# Patient Record
Sex: FEMALE | Race: WHITE | Employment: STUDENT | URBAN - METROPOLITAN AREA
[De-identification: names, ages, dates, MRNs, and addresses within clinical notes are randomized per-mention and may not be internally consistent; named-entity substitution may affect disease eponyms.]

---
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
## 2022-09-27 NOTE — TELEPHONE ENCOUNTER
NO PAP SMEAR. Please review; protocol failed/no protocol.      Requested Prescriptions   Pending Prescriptions Disp Refills    NORGESTIM-ETH ESTRAD TRIPHASIC 0.18/0.215/0.25 MG-35 MCG Oral Tab [Pharmacy Med Name: Zoraida Webster 0.18-0.215-0.25/0.035] 84 tablet 3     Sig: TAKE 1 TABLET BY MOUTH EVERY DAY        Gynecology Medication Protocol Failed - 9/26/2022  9:25 PM        Failed - Pass dependent on manual look-up of last PAP and patient compliance with PAP follow up recommendations        Passed - In person appointment or virtual visit in the past 12 mos or appointment in next 3 mos       Recent Outpatient Visits              11 months ago Annual physical exam    Obinna Yung MD    Office Visit    1 year ago Encounter for well woman exam with routine gynecological exam    [place] [facility], [address] [place] [address], Isreal Cruz APRN    Office Visit    3 years ago Well adolescent visit    [facility] [place] [facility]    Office Visit    4 years ago Missed menses    [place] [facility], [address] [place], [place] [address]Tanner Mol, APRN    Office Visit    6 years ago [facility] adolescent visit    [facility] [place] [facility]    Office Visit                        Recent Outpatient Visits              11 months ago Annual physical exam    Obinna Yung MD    Office Visit    1 year ago Encounter for well woman exam with routine gynecological exam    [place] [facility], [address] [place], [place] [address]Anthony Butler, APRN    Office Visit    3 years ago Well adolescent visit    [address] [place] for Vi Villalpando MD    Office Visit    4 years ago Missed menses    [place] [facility], [address] [place] [address], [address], APRN    Office Visit    6 years ago Well adolescent visit    [address] [facility] MD    Office Visit

## 2022-10-03 RX ORDER — NORGESTIMATE AND ETHINYL ESTRADIOL 7DAYSX3 28
1 KIT ORAL DAILY
Qty: 90 EACH | Refills: 3 | OUTPATIENT
Start: 2022-10-03

## 2022-10-03 RX ORDER — NORGESTIMATE AND ETHINYL ESTRADIOL 7DAYSX3 28
1 KIT ORAL DAILY
Qty: 90 EACH | Refills: 3 | Status: SHIPPED | OUTPATIENT
Start: 2022-10-03

## 2022-10-03 RX ORDER — NORGESTIMATE AND ETHINYL ESTRADIOL 7DAYSX3 28
1 KIT ORAL DAILY
Qty: 84 TABLET | Refills: 1 | Status: SHIPPED | OUTPATIENT
Start: 2022-10-03

## 2024-02-06 RX ORDER — NORGESTIMATE AND ETHINYL ESTRADIOL 7DAYSX3 28
1 KIT ORAL DAILY
Qty: 84 TABLET | Refills: 0 | Status: SHIPPED | OUTPATIENT
Start: 2024-02-06

## 2024-02-06 NOTE — TELEPHONE ENCOUNTER
[employer] message with recommendation sent to pt, due for appt.   To reception staff, pls call pt for appt send back to Triage pool once appt made.  Thanks         No future appointments.        Protocol Failed/ No Protocol    Requested Prescriptions   Pending Prescriptions Disp Refills    NORGESTIM-ETH ESTRAD TRIPHASIC 0.18/0.215/0.25 MG-35 MCG Oral Tab [Pharmacy Med Name: [facility] 0.18-0.215-0.25/0.035] 84 tablet 3     Sig: Take 1 tablet by mouth daily. PT NEEDS TO SCHEDULE ANNUAL FOR ADDITIONAL REFILLS.               Recent Outpatient Visits              2 years ago Annual physical exam    [facility] [place] Cynthia Latham MD    Office Visit    3 years ago Encounter for well woman exam with routine gynecological exam    [facility] Olivia Grijalva APRN    Office Visit    4 years ago Well adolescent visit    [facility] [place]    Office Visit    5 years ago Missed menses    [facility] Lizzie Dc APRN    Office Visit    7 years ago Well adolescent visit    [facility] [place]    Office Visit

## 2024-07-13 ENCOUNTER — TELEPHONE (OUTPATIENT)
Dept: OBGYN CLINIC | Facility: CLINIC | Age: 24
End: 2024-07-13

## 2024-07-13 NOTE — TELEPHONE ENCOUNTER
Patient made an appointment via [employer] for 7/18 with Dr Gamez to establish prenatal care. Please call to advise.

## 2024-07-15 ENCOUNTER — OFFICE VISIT (OUTPATIENT)
Dept: OBGYN CLINIC | Facility: CLINIC | Age: 24
End: 2024-07-15
Payer: COMMERCIAL

## 2024-07-15 VITALS
BODY MASS INDEX: 19.66 KG/M2 | WEIGHT: 118 LBS | DIASTOLIC BLOOD PRESSURE: 76 MMHG | SYSTOLIC BLOOD PRESSURE: 121 MMHG | HEART RATE: 82 BPM | HEIGHT: 65 IN

## 2024-07-15 DIAGNOSIS — N92.6 MISSED MENSES: Primary | ICD-10-CM

## 2024-07-15 DIAGNOSIS — Z34.00: ICD-10-CM

## 2024-07-15 PROCEDURE — 3008F BODY MASS INDEX DOCD: CPT | Performed by: ADVANCED PRACTICE MIDWIFE

## 2024-07-15 PROCEDURE — 3078F DIAST BP <80 MM HG: CPT | Performed by: ADVANCED PRACTICE MIDWIFE

## 2024-07-15 PROCEDURE — 99214 OFFICE O/P EST MOD 30 MIN: CPT | Performed by: ADVANCED PRACTICE MIDWIFE

## 2024-07-15 PROCEDURE — 3074F SYST BP LT 130 MM HG: CPT | Performed by: ADVANCED PRACTICE MIDWIFE

## 2024-07-15 RX ORDER — MELATONIN
50 3 TIMES DAILY
Qty: 90 TABLET | Refills: 2 | Status: SHIPPED | OUTPATIENT
Start: 2024-07-15

## 2024-07-15 NOTE — PROGRESS NOTES
Subjective:   Patient ID: Melissa Desai is a 24 year old female.    Melissa presents for missed menses visit. LMP approximately 6/1/24 with regular periods. GA by LMP 5.5wks. +HPT last Monday. Reports nausea and vomiting. Denies bleeding or pelvic pain.    Denies significant medical history        History/Other:   Review of Systems   All other systems reviewed and are negative.    Current Outpatient Medications   Medication Sig Dispense Refill    Doxylamine Succinate, Sleep, 25 MG Oral Tab Take 25 mg by mouth nightly as needed for Sleep. 30 tablet 2    pyridoxine 50 MG Oral Tab Take 1 tablet (50 mg total) by mouth in the morning, at noon, and at bedtime. 90 tablet 2    Norgestim-Eth Estrad Triphasic 0.18/0.215/0.25 MG-35 MCG Oral Tab Take 1 tablet by mouth daily. PT NEEDS TO SCHEDULE ANNUAL FOR ADDITIONAL REFILLS. (Patient not taking: Reported on 7/15/2024) 84 tablet 0    Norgestim-Eth Estrad Triphasic 0.18/0.215/0.25 MG-35 MCG Oral Tab Take 1 tablet by mouth daily. (Patient not taking: Reported on 7/15/2024) 84 tablet 1     Allergies:No Known Allergies    Objective:   Physical Exam  Vitals and nursing note reviewed.   Constitutional:       General: She is not in acute distress.     Appearance: Normal appearance. She is normal weight. She is not ill-appearing, toxic-appearing or diaphoretic.   Cardiovascular:      Pulses: Normal pulses.   Pulmonary:      Effort: Pulmonary effort is normal.   Neurological:      Mental Status: She is alert and oriented to person, place, and time.   Psychiatric:         Mood and Affect: Mood normal.         Behavior: Behavior normal.         Thought Content: Thought content normal.         Judgment: Judgment normal.         Assessment & Plan:   1. Missed menses    2. Normal first pregnancy with uncertain date of LMP, antepartum (HCC)        No orders of the defined types were placed in this encounter.      Meds This Visit:  Requested Prescriptions     Signed Prescriptions Disp  Refills    Doxylamine Succinate, Sleep, 25 MG Oral Tab 30 tablet 2     Sig: Take 25 mg by mouth nightly as needed for Sleep.    pyridoxine 50 MG Oral Tab 90 tablet 2     Sig: Take 1 tablet (50 mg total) by mouth in the morning, at noon, and at bedtime.       Imaging & Referrals:  US PREG 1ST TRIMESTER (CPT=76801)    1.  Discussed importance of folic acid, calcium, vitamin D.   2.  Reviewed pregnancy recommendations regarding weight gain, diet, fish consumption, consumption of deli-meats and hot dogs, making sure food is appropriately cooked and washed to avoid food-borne illnesses.    3.  Travel discussed, avoid travel to Covid zones, use of support stockings with flights longer than 3 hours, movement every 2-3 hours if driving  4.  Discussed exercise, avoid jacuzzi, sauna, hot tubs and steam rooms.   5.  Discussed avoidance of alcohol, smoking, and minimizing caffeine.    6.  Warning signs reviewed advised to call office if occur.    7.  First Trimester chromosomal screening Cell free Fetal DNA and US schedule discussed.  Desires First trimester screen testing  8. Genetic screening and ACOG/ ACMG guidelines for CF, SMA, Fragile X & hemoglobinopathies.  Informrd our offices uses the Preparent Standard Panel but other sources are available.  If interested they should check their insurance and various costs  9. ONTD risks discussed. No risk factors identified.  10. Pre- Eclampsia Risk Assessment:   High risk Factors- none  Moderate Risk Factors: nullip    Ultrasound ordered due to uncertain LMP    Discussed comfort measures for nausea and vomiting. Recommend unisom and B6.     12.  Schedule RN OB ED visit   13.  30 minutes face to face counseling, chart review, orders and coordination of care

## 2024-09-13 ENCOUNTER — TELEPHONE (OUTPATIENT)
Dept: OBGYN CLINIC | Facility: CLINIC | Age: 24
End: 2024-09-13

## 2024-09-13 RX ORDER — MELATONIN
50 3 TIMES DAILY
Qty: 90 TABLET | Refills: 2 | Status: SHIPPED | OUTPATIENT
Start: 2024-09-13

## 2024-09-13 NOTE — TELEPHONE ENCOUNTER
Vitamin B-6 50 Mg tablet     Directions for use: take 1 tablet (50mg) by mouth in the morning, at noon and at bedtime     Qty: 270     PATIENT IS REQUESTING A 90 DAY SUPPLY

## 2024-11-11 RX ORDER — NORGESTIMATE AND ETHINYL ESTRADIOL 7DAYSX3 28
1 KIT ORAL DAILY
Qty: 84 TABLET | Refills: 0 | Status: SHIPPED | OUTPATIENT
Start: 2024-11-11

## 2024-11-11 NOTE — TELEPHONE ENCOUNTER
Protocol Failed/ No Protocol    Requested Prescriptions   Pending Prescriptions Disp Refills    NORGESTIM-ETH ESTRAD TRIPHASIC 0.18/0.215/0.25 MG-35 MCG Oral Tab [Pharmacy Med Name: [facility] 0.18-0.215-0.25/0.035] 84 tablet 0     Sig: Take 1 tablet by mouth daily. PT NEEDS TO SCHEDULE ANNUAL FOR ADDITIONAL REFILLS.       Gynecology Medication Protocol Failed - 11/11/2024  3:21 PM        Failed - PASS-PENDING LAST PAP WNL--VIA MANUAL LOOKUP        Failed - Physical or Pelvic/Breast in past 12 or next 3 mos--VIA MANUAL LOOKUP               Recent Outpatient Visits              3 months ago Missed menses    [facility] Sobia Payton CNM    Office Visit    3 years ago Annual physical exam    [facility] [place]    Office Visit    4 years ago Encounter for well woman exam with routine gynecological exam    [facility] Olivia Grijalva APRN    Office Visit    5 years ago Well adolescent visit    [facility] [place]    Office Visit    6 years ago Missed menses    [facility] Lizzie Dc APN    Office Visit

## 2025-01-10 ENCOUNTER — OFFICE VISIT (OUTPATIENT)
Dept: OBGYN CLINIC | Facility: CLINIC | Age: 25
End: 2025-01-10
Payer: COMMERCIAL

## 2025-01-10 VITALS
WEIGHT: 131 LBS | BODY MASS INDEX: 22.36 KG/M2 | DIASTOLIC BLOOD PRESSURE: 89 MMHG | HEART RATE: 121 BPM | HEIGHT: 64 IN | SYSTOLIC BLOOD PRESSURE: 137 MMHG

## 2025-01-10 DIAGNOSIS — N92.6 MISSED MENSES: Primary | ICD-10-CM

## 2025-01-10 LAB
CONTROL LINE PRESENT WITH A CLEAR BACKGROUND (YES/NO): YES YES/NO
KIT LOT #: NORMAL NUMERIC
PREGNANCY TEST, URINE: POSITIVE

## 2025-01-10 PROCEDURE — 99214 OFFICE O/P EST MOD 30 MIN: CPT | Performed by: ADVANCED PRACTICE MIDWIFE

## 2025-01-10 PROCEDURE — 81025 URINE PREGNANCY TEST: CPT | Performed by: ADVANCED PRACTICE MIDWIFE

## 2025-01-10 PROCEDURE — 3075F SYST BP GE 130 - 139MM HG: CPT | Performed by: ADVANCED PRACTICE MIDWIFE

## 2025-01-10 PROCEDURE — 3079F DIAST BP 80-89 MM HG: CPT | Performed by: ADVANCED PRACTICE MIDWIFE

## 2025-01-10 PROCEDURE — 3008F BODY MASS INDEX DOCD: CPT | Performed by: ADVANCED PRACTICE MIDWIFE

## 2025-01-10 NOTE — PROGRESS NOTES
Melissa Desai is a 24 year old female.    HPI:     Chief Complaint   Patient presents with    Gyn Exam     Missed menses, chavo 2025 lmp 10/21/2024 pt is 11 weeks and four days.            Had EAB over the summer. This is same partner. He is involved, unplanned but happy.  Sure LMP 10/21/24, CHAVO 25, now at 11 4/7 wks  Denies pain or bleeding. + nausea but now resolved, currently no vomiting.  Denies fevers, flu like symptoms or symptoms of URI  Current medications: PNV    OB History    Para Term  AB Living   2 0 0 0 1 0   SAB IAB Ectopic Multiple Live Births   0 0 0 0 0       Menarche: 17  Period Cycle (Days): pregnant  Period Duration (Days): pregnant  Period Flow: pregnant  Use of Birth Control (if yes, specify type): None      HISTORY:  Past Medical History:    Hemangioma of skin    right arm      History reviewed. No pertinent surgical history.   Family History   Problem Relation Age of Onset    Diabetes Paternal Grandfather     Cancer Paternal Grandfather     Hypertension Paternal Grandfather       Social History:   Social History     Socioeconomic History    Marital status: Single   Tobacco Use    Smoking status: Never    Smokeless tobacco: Never   Substance and Sexual Activity    Alcohol use: No    Drug use: No        Medications (Active prior to today's visit):  Current Outpatient Medications   Medication Sig Dispense Refill    Norgestim-Eth Estrad Triphasic 0.18/0.215/0.25 MG-35 MCG Oral Tab Take 1 tablet by mouth daily. PT NEEDS TO SCHEDULE ANNUAL FOR ADDITIONAL REFILLS. (Patient not taking: Reported on 1/10/2025) 84 tablet 0    pyridoxine 50 MG Oral Tab Take 1 tablet (50 mg total) by mouth in the morning, at noon, and at bedtime. (Patient not taking: Reported on 1/10/2025) 90 tablet 2    Doxylamine Succinate, Sleep, 25 MG Oral Tab Take 25 mg by mouth nightly as needed for Sleep. (Patient not taking: Reported on 1/10/2025) 30 tablet 2    Norgestim-Eth Estrad Triphasic  0.18/0.215/0.25 MG-35 MCG Oral Tab Take 1 tablet by mouth daily. (Patient not taking: Reported on 1/10/2025) 84 tablet 1       Allergies:  Allergies[1]      ROS:       History obtained from the patient  General ROS: positive for  - fatigue  negative for - chills, fever, or night sweats  Psychological ROS: negative for - anxiety, depression, mood swings, or suicidal ideation  ENT ROS: negative for - headaches, nasal congestion, or nasal discharge  Allergy and Immunology ROS: negative for - nasal congestion or postnasal drip  Respiratory ROS: no cough, shortness of breath, or wheezing  Cardiovascular ROS: no chest pain or dyspnea on exertion  Gastrointestinal ROS: negative for - nausea/vomiting  negative for - abdominal pain or change in stools  Genito-Urinary ROS: no dysuria, trouble voiding, or hematuria  Neurological ROS: negative for - dizziness or headaches      PHYSICAL EXAM:     Vitals:    01/10/25 0934   BP: 137/89   Pulse: (!) 121       Physical Exam  Constitutional:       General: She is not in acute distress.     Appearance: Normal appearance. She is not ill-appearing.   Pulmonary:      Effort: Pulmonary effort is normal.   Abdominal:      General: Abdomen is flat.      Palpations: Abdomen is soft.      Tenderness: There is no abdominal tenderness.   Neurological:      Mental Status: She is alert and oriented to person, place, and time.   Psychiatric:         Mood and Affect: Mood normal.         Behavior: Behavior normal.         Thought Content: Thought content normal.          Urine HCG +   Initially unable to hear FHT by doppler. Limited bs abdominal ultrasound with + IUP, + FM and cardiac activity.  Then able to doppler FHT @ 150 BMP     ASSESSMENT/PLAN:      There are no diagnoses linked to this encounter.           1.  Continue PNV with DHA.   2.  Reviewed pregnancy recommendations and avoidances of pregnancy and written information provided.   3.  Travel discussed, increased risk of clotting in  pregnancy, so recommend use of support stockings with flights longer than 3 hours, movement every 2-3 hours if driving  4.  Warning signs reviewed advised to call office if occur.    5.  First Trimester chromosomal screening, genetic screening, free Cell Fetal DNA, carrier screening and US schedule discussed. Offered optional u/s for dating/viability.   6. Offered flu vaccine, declined  7. ONTD risks discussed. No risk factors    8. Pre- Eclampsia Risk Assessment:   High risk Factors- none  Moderate Risk Factors: Primip    High risk factors. 1 of below:  -Hx of pre-e  -Autoimmune disease (lupus, antiphospholipid syndrome)  -Multifetal pregnancy  -CHTN  -DM (type 1 or 2)    Moderate risk factors. 2 or More:   -Nullip  -Obesity  -Family hx preeclampsia (mother or sister)  - Socioeconomic characteristics (AA race, low socioeconomic status)  -AMA  -Personal history factors (Hx of low birth weight or SGA, previous adverse pregnancy outcome, > 10 yr preg interval)    9.  Schedule RN OB ED visit         My total time spent caring for the patient on the day of the encounter:  30 minutes, which included: chart review, exam, care coordination, charting, and counseling as per above.          1/10/2025  Melissa Barillas CNM           [1] No Known Allergies

## 2025-01-17 ENCOUNTER — TELEPHONE (OUTPATIENT)
Dept: OBGYN CLINIC | Facility: CLINIC | Age: 25
End: 2025-01-17

## 2025-01-23 ENCOUNTER — NURSE ONLY (OUTPATIENT)
Dept: OBGYN CLINIC | Facility: CLINIC | Age: 25
End: 2025-01-23

## 2025-01-23 DIAGNOSIS — Z34.01 ENCOUNTER FOR SUPERVISION OF NORMAL FIRST PREGNANCY IN FIRST TRIMESTER (HCC): Primary | ICD-10-CM

## 2025-01-23 RX ORDER — CHOLECALCIFEROL (VITAMIN D3) 25 MCG
1 TABLET,CHEWABLE ORAL DAILY
COMMUNITY

## 2025-01-23 NOTE — PROGRESS NOTES
Pt is a  with EDC of 2025 based on 10/21/2024.     Med Hx-Hemangioma of skin    OB Hx-EAB     Telephone OB RN Education visit. Education materials reviewed with pt including, but not limited to: plan of care, safe medications, guidance on nutrition, travel, food safety, when to call the faustino MONTOYA.     Pt agreeable to blood transfusion if needed.     First trimester prenatal labs ordered for pt.     Pt counseled on the availability of genetic screenings including: cell free DNA, FTS w/US,Quad screen, MSAFP, and CF screening. Pt requesting cell free DNA testing. Pt to receive kit and paperwork at next visit.      Media policy for [facility] reviewed with pt.    EPDS score for today-0    Epidural-open    Circumcision-yes    Feeding-breast    Transfusion-yes    How pt heard about the midwives office-on-line

## 2025-02-03 RX ORDER — NORGESTIMATE AND ETHINYL ESTRADIOL 7DAYSX3 28
1 KIT ORAL DAILY
Qty: 84 TABLET | Refills: 0 | OUTPATIENT
Start: 2025-02-03

## 2025-02-06 ENCOUNTER — INITIAL PRENATAL (OUTPATIENT)
Dept: OBGYN CLINIC | Facility: CLINIC | Age: 25
End: 2025-02-06
Payer: COMMERCIAL

## 2025-02-06 VITALS
HEART RATE: 92 BPM | WEIGHT: 139 LBS | BODY MASS INDEX: 24 KG/M2 | SYSTOLIC BLOOD PRESSURE: 121 MMHG | DIASTOLIC BLOOD PRESSURE: 83 MMHG

## 2025-02-06 DIAGNOSIS — Z36.89 ENCOUNTER FOR FETAL ANATOMIC SURVEY (HCC): ICD-10-CM

## 2025-02-06 DIAGNOSIS — Z34.01 ENCOUNTER FOR SUPERVISION OF NORMAL FIRST PREGNANCY IN FIRST TRIMESTER (HCC): Primary | ICD-10-CM

## 2025-02-06 DIAGNOSIS — Z11.3 SCREEN FOR STD (SEXUALLY TRANSMITTED DISEASE): ICD-10-CM

## 2025-02-06 PROBLEM — Z34.90 PREGNANT (HCC): Status: ACTIVE | Noted: 2025-02-06

## 2025-02-06 PROCEDURE — 3079F DIAST BP 80-89 MM HG: CPT | Performed by: ADVANCED PRACTICE MIDWIFE

## 2025-02-06 PROCEDURE — 3074F SYST BP LT 130 MM HG: CPT | Performed by: ADVANCED PRACTICE MIDWIFE

## 2025-02-06 NOTE — PROGRESS NOTES
Here for NOB visit, feeling well overall, no nausea, some fatigue. Denies 2nd trimester danger signs. Here today with stepdaughter       Patient's last menstrual period was 10/21/2024 (exact date). 2025, by Last Menstrual Period 15w3d     NOB labs- needs to complete, will try to today  Genetic screening- Given Elisa Kit  Ultrasound: Level 1 with OBs ordered  Med hx: none  Allergies: NKDA.   Problem list- Updated  Abuse/Feel safe in home- yes  [unfilled] 0 at nurse ed    Pre-e risk: low risk, Primip   Prior births:n/a    Physical: see flowsheet  Pap: never had, declines today. Will complete at another visit or PP    Warning signs reviewed. RTC in 4 weeks    AISHA Hardwick under direct supervision and in collaboration with Delores Kirk CNM

## 2025-03-01 ENCOUNTER — LAB ENCOUNTER (OUTPATIENT)
Dept: LAB | Facility: HOSPITAL | Age: 25
End: 2025-03-01
Attending: ADVANCED PRACTICE MIDWIFE
Payer: COMMERCIAL

## 2025-03-01 DIAGNOSIS — Z11.3 SCREEN FOR STD (SEXUALLY TRANSMITTED DISEASE): ICD-10-CM

## 2025-03-01 DIAGNOSIS — Z34.01 ENCOUNTER FOR SUPERVISION OF NORMAL FIRST PREGNANCY IN FIRST TRIMESTER (HCC): ICD-10-CM

## 2025-03-01 LAB
ANTIBODY SCREEN: NEGATIVE
BASOPHILS # BLD AUTO: 0.02 X10(3) UL (ref 0–0.2)
BASOPHILS NFR BLD AUTO: 0.2 %
DEPRECATED RDW RBC AUTO: 45.9 FL (ref 35.1–46.3)
EOSINOPHIL # BLD AUTO: 0.08 X10(3) UL (ref 0–0.7)
EOSINOPHIL NFR BLD AUTO: 0.7 %
ERYTHROCYTE [DISTWIDTH] IN BLOOD BY AUTOMATED COUNT: 12.9 % (ref 11–15)
EST. AVERAGE GLUCOSE BLD GHB EST-MCNC: 103 MG/DL (ref 68–126)
HBA1C MFR BLD: 5.2 % (ref ?–5.7)
HBV SURFACE AG SER-ACNC: <0.1 [IU]/L
HBV SURFACE AG SERPL QL IA: NONREACTIVE
HCT VFR BLD AUTO: 34 %
HCV AB SERPL QL IA: NONREACTIVE
HGB BLD-MCNC: 11.4 G/DL
IMM GRANULOCYTES # BLD AUTO: 0.04 X10(3) UL (ref 0–1)
IMM GRANULOCYTES NFR BLD: 0.3 %
LYMPHOCYTES # BLD AUTO: 1.86 X10(3) UL (ref 1–4)
LYMPHOCYTES NFR BLD AUTO: 16.1 %
MCH RBC QN AUTO: 32.5 PG (ref 26–34)
MCHC RBC AUTO-ENTMCNC: 33.5 G/DL (ref 31–37)
MCV RBC AUTO: 96.9 FL
MONOCYTES # BLD AUTO: 0.61 X10(3) UL (ref 0.1–1)
MONOCYTES NFR BLD AUTO: 5.3 %
NEUTROPHILS # BLD AUTO: 8.96 X10 (3) UL (ref 1.5–7.7)
NEUTROPHILS # BLD AUTO: 8.96 X10(3) UL (ref 1.5–7.7)
NEUTROPHILS NFR BLD AUTO: 77.4 %
PLATELET # BLD AUTO: 343 10(3)UL (ref 150–450)
RBC # BLD AUTO: 3.51 X10(6)UL
RH BLOOD TYPE: POSITIVE
RUBV IGG SER QL: POSITIVE
RUBV IGG SER-ACNC: 77 IU/ML (ref 10–?)
T PALLIDUM AB SER QL IA: NONREACTIVE
WBC # BLD AUTO: 11.6 X10(3) UL (ref 4–11)

## 2025-03-01 PROCEDURE — 86901 BLOOD TYPING SEROLOGIC RH(D): CPT | Performed by: ADVANCED PRACTICE MIDWIFE

## 2025-03-01 PROCEDURE — 86850 RBC ANTIBODY SCREEN: CPT | Performed by: ADVANCED PRACTICE MIDWIFE

## 2025-03-01 PROCEDURE — 86787 VARICELLA-ZOSTER ANTIBODY: CPT | Performed by: ADVANCED PRACTICE MIDWIFE

## 2025-03-01 PROCEDURE — 87340 HEPATITIS B SURFACE AG IA: CPT | Performed by: ADVANCED PRACTICE MIDWIFE

## 2025-03-01 PROCEDURE — 87086 URINE CULTURE/COLONY COUNT: CPT | Performed by: ADVANCED PRACTICE MIDWIFE

## 2025-03-01 PROCEDURE — 87389 HIV-1 AG W/HIV-1&-2 AB AG IA: CPT | Performed by: ADVANCED PRACTICE MIDWIFE

## 2025-03-01 PROCEDURE — 85025 COMPLETE CBC W/AUTO DIFF WBC: CPT | Performed by: ADVANCED PRACTICE MIDWIFE

## 2025-03-01 PROCEDURE — 86803 HEPATITIS C AB TEST: CPT | Performed by: ADVANCED PRACTICE MIDWIFE

## 2025-03-01 PROCEDURE — 87491 CHLMYD TRACH DNA AMP PROBE: CPT | Performed by: ADVANCED PRACTICE MIDWIFE

## 2025-03-01 PROCEDURE — 86900 BLOOD TYPING SEROLOGIC ABO: CPT | Performed by: ADVANCED PRACTICE MIDWIFE

## 2025-03-01 PROCEDURE — 83021 HEMOGLOBIN CHROMOTOGRAPHY: CPT | Performed by: ADVANCED PRACTICE MIDWIFE

## 2025-03-01 PROCEDURE — 86762 RUBELLA ANTIBODY: CPT | Performed by: ADVANCED PRACTICE MIDWIFE

## 2025-03-01 PROCEDURE — 86780 TREPONEMA PALLIDUM: CPT | Performed by: ADVANCED PRACTICE MIDWIFE

## 2025-03-01 PROCEDURE — 83036 HEMOGLOBIN GLYCOSYLATED A1C: CPT | Performed by: ADVANCED PRACTICE MIDWIFE

## 2025-03-01 PROCEDURE — 87591 N.GONORRHOEAE DNA AMP PROB: CPT | Performed by: ADVANCED PRACTICE MIDWIFE

## 2025-03-01 PROCEDURE — 83020 HEMOGLOBIN ELECTROPHORESIS: CPT | Performed by: ADVANCED PRACTICE MIDWIFE

## 2025-03-03 LAB
C TRACH DNA SPEC QL NAA+PROBE: NEGATIVE
N GONORRHOEA DNA SPEC QL NAA+PROBE: NEGATIVE
VZV IGG SER IA-ACNC: 1.32 (ref 1–?)

## 2025-03-04 ENCOUNTER — MED REC SCAN ONLY (OUTPATIENT)
Dept: OBGYN CLINIC | Facility: CLINIC | Age: 25
End: 2025-03-04

## 2025-03-05 LAB
HGB A2 MFR BLD: 2.8 % (ref 1.5–3.5)
HGB PNL BLD ELPH: 97.2 % (ref 95.5–100)

## 2025-03-06 ENCOUNTER — ROUTINE PRENATAL (OUTPATIENT)
Dept: OBGYN CLINIC | Facility: CLINIC | Age: 25
End: 2025-03-06
Payer: COMMERCIAL

## 2025-03-06 VITALS
SYSTOLIC BLOOD PRESSURE: 142 MMHG | HEART RATE: 98 BPM | BODY MASS INDEX: 26 KG/M2 | WEIGHT: 150 LBS | DIASTOLIC BLOOD PRESSURE: 78 MMHG

## 2025-03-06 DIAGNOSIS — Z34.92 PRENATAL CARE, SECOND TRIMESTER (HCC): Primary | ICD-10-CM

## 2025-03-06 PROCEDURE — 3077F SYST BP >= 140 MM HG: CPT | Performed by: ADVANCED PRACTICE MIDWIFE

## 2025-03-06 PROCEDURE — 3078F DIAST BP <80 MM HG: CPT | Performed by: ADVANCED PRACTICE MIDWIFE

## 2025-03-06 NOTE — PROGRESS NOTES
Feeling well. Not yet feeling fetal movement. Denies contractions, LOF, vaginal bleeding.     Has not schedule ultrasound. Provided phone number for her to schedule.    Blood pressure was mildly elevated. She had coffee before coming. Discussed monitoring blood pressure at home.     Plan:  BP check next week  PASTOR 4 weeks    Reviewed second trimester warning signs and when to call.

## 2025-03-10 ENCOUNTER — TELEPHONE (OUTPATIENT)
Dept: OBGYN CLINIC | Facility: CLINIC | Age: 25
End: 2025-03-10

## 2025-03-10 NOTE — TELEPHONE ENCOUNTER
Pt verified name and .    Pt has been unable to schedule ultrasound. Informed pt that ultrasound was ordered with [facility]- office. Phone number and directions provided for scheduling. Advised that pt call the office if they are still experiencing difficulty scheduling. Pt verbalized understanding and agreed.

## 2025-03-12 ENCOUNTER — TELEPHONE (OUTPATIENT)
Dept: OBGYN CLINIC | Facility: CLINIC | Age: 25
End: 2025-03-12

## 2025-03-12 DIAGNOSIS — Z34.92 PRENATAL CARE, SECOND TRIMESTER (HCC): Primary | ICD-10-CM

## 2025-03-12 NOTE — TELEPHONE ENCOUNTER
Pt verified name and .    Pt requesting anatomy scan order with another department as [place] [id number] office is not able to schedule pt until April. Pt states that [facility]- office ultrasound technician is out of the office until April, and pt would like to be seen in another office to be seen sooner. Advised that message will be sent to CNM on call for approval of ultrasound order with another office/department. Pt verbalized understanding and agreed. Pt requesting update via [employer] message.

## 2025-03-12 NOTE — TELEPHONE ENCOUNTER
Patient is calling  said she can't get ultrasound till 25 weeks asking a nurse to call her to  change order to get done sooner

## 2025-03-13 ENCOUNTER — NURSE ONLY (OUTPATIENT)
Dept: OBGYN CLINIC | Facility: CLINIC | Age: 25
End: 2025-03-13
Payer: COMMERCIAL

## 2025-03-13 VITALS — HEART RATE: 105 BPM | DIASTOLIC BLOOD PRESSURE: 76 MMHG | SYSTOLIC BLOOD PRESSURE: 123 MMHG

## 2025-03-13 DIAGNOSIS — Z01.30 BLOOD PRESSURE CHECK: Primary | ICD-10-CM

## 2025-03-13 PROCEDURE — 3074F SYST BP LT 130 MM HG: CPT | Performed by: ADVANCED PRACTICE MIDWIFE

## 2025-03-13 PROCEDURE — 3078F DIAST BP <80 MM HG: CPT | Performed by: ADVANCED PRACTICE MIDWIFE

## 2025-03-13 NOTE — PROGRESS NOTES
Pt presents to the office for a BP check.    Pt verified name and .    Vitals:    25 1220   BP: 123/76   Pulse: 105     BP WNL. Return OB appointment scheduled on . Advised that pt continue to monitor BP at home and notify the office of any elevated BP or symptoms of headaches, visual changes, or severe swelling in lower extremities. Pt verbalized understanding and agreed with plan of care.

## 2025-03-18 ENCOUNTER — HOSPITAL ENCOUNTER (OUTPATIENT)
Dept: PERINATAL CARE | Facility: HOSPITAL | Age: 25
Discharge: HOME | End: 2025-03-18
Attending: ADVANCED PRACTICE MIDWIFE
Payer: COMMERCIAL

## 2025-03-18 ENCOUNTER — HOSPITAL ENCOUNTER (OUTPATIENT)
Dept: PERINATAL CARE | Facility: HOSPITAL | Age: 25
Discharge: HOME | End: 2025-03-18
Attending: OBSTETRICS & GYNECOLOGY
Payer: COMMERCIAL

## 2025-03-18 VITALS
DIASTOLIC BLOOD PRESSURE: 78 MMHG | WEIGHT: 157 LBS | SYSTOLIC BLOOD PRESSURE: 124 MMHG | BODY MASS INDEX: 27 KG/M2 | HEART RATE: 144 BPM

## 2025-03-18 DIAGNOSIS — Z36.89 ENCOUNTER FOR FETAL ANATOMIC SURVEY (HCC): Primary | ICD-10-CM

## 2025-03-18 DIAGNOSIS — Z36.89 ENCOUNTER FOR FETAL ANATOMIC SURVEY (HCC): ICD-10-CM

## 2025-03-18 PROCEDURE — 76805 OB US >/= 14 WKS SNGL FETUS: CPT | Performed by: OBSTETRICS & GYNECOLOGY

## 2025-03-18 NOTE — PROGRESS NOTES
STANDARD OBSTETRIC ULTRASOUND REPORT   See imaging tab for complete consultation / ultrasound report      Ultrasound Findings:  Single IUP in breech presentation.    Placenta is anterior.   A 3 vessel cord is noted.  Cardiac activity is present at 140 bpm   g ( 0 lb 15 oz);   MVP is 5.3 cm .         Uterus and adnexa appeared normal  today on US     Fetal Anatomy:  Visualized with normal appearance: head, face, spine, neck, skin, chest, abdominal wall, gastrointestinal tract, kidneys, bladder, extremities.   Brain: Visualized and normal appearances: brain parenchyma, cerebral ventricles, choroid plexus, Cisterna Magna, midline falx, cerebellum, cerebellar lobes, posterior fossa, vermis, cavum septi pellucidi.  Face: eyes normal, profile normal, nose normal, lip normal, palate normal.  Heart: visualized and normal appearance: 3 vessel view, four-chamber, left outflow tract, right outflow tract, arches.     Genetic Sonogram:  Nuchal fold normal.    Pylelectasis absent.    No hyperechogenic bowel.    Echogenic intracardiac foci absent.   Nasal bone present.   Choroid plexus cyst absent.     Summary of Ultrasound findings:  This is a Leiva pregnancy       The fetal measurements are consistent with established EDC. No gross ultrasound evidence of structural abnormalities are seen today. No minor markers for aneuploidy are seen. The patient understands that ultrasound cannot rule out all structural and chromosomal abnormalities.      IMPRESSION  IUP @ 21w1d  Scan consistent with dates  No fetal structural abnormalities seen    RECOMMENDATIONS:  Routine antepartum care    Yaneli Dinero MD      This was an ultrasound only encounter (no physician visit).  The ultrasound was read by Dr. Dinero and the report was sent to  Ms. Kirk to discuss with the patient.    Note to patient and family  The 21st Century Cures Act makes medical notes available to patients in the interest of transparency.  However, please be  advised that this is a medical document.  It is intended as [id number]-erhs communication.  It is written and medical language may contain abbreviations or verbiage that are technical and unfamiliar.  It may appear blunt or direct.  Medical documents are intended to carry relevant information, facts as evident, and the clinical opinion of the practitioner.

## 2025-04-01 ENCOUNTER — ROUTINE PRENATAL (OUTPATIENT)
Dept: OBGYN CLINIC | Facility: CLINIC | Age: 25
End: 2025-04-01
Payer: COMMERCIAL

## 2025-04-01 VITALS
HEART RATE: 98 BPM | SYSTOLIC BLOOD PRESSURE: 133 MMHG | WEIGHT: 162 LBS | BODY MASS INDEX: 28 KG/M2 | DIASTOLIC BLOOD PRESSURE: 77 MMHG

## 2025-04-01 DIAGNOSIS — Z34.02 ENCOUNTER FOR SUPERVISION OF NORMAL FIRST PREGNANCY IN SECOND TRIMESTER (HCC): Primary | ICD-10-CM

## 2025-04-01 PROBLEM — R03.0 ELEVATED BLOOD PRESSURE READING IN OFFICE WITHOUT DIAGNOSIS OF HYPERTENSION: Status: ACTIVE | Noted: 2025-04-01

## 2025-04-01 PROCEDURE — 3078F DIAST BP <80 MM HG: CPT | Performed by: ADVANCED PRACTICE MIDWIFE

## 2025-04-01 PROCEDURE — 3075F SYST BP GE 130 - 139MM HG: CPT | Performed by: ADVANCED PRACTICE MIDWIFE

## 2025-04-01 NOTE — PROGRESS NOTES
Melissa, , is at 23w1d, here for her return OB visit.  Currently, she is feeling well. Denies 2nd trimester danger signs. Endorses active fetus. Has not been checking BP at home. Doesn't think she has ever had high BP before pregnancy but she's not sure.       Vital signs and weight reviewed  See flowsheets     Assessment/Plan:   Rx for BP cuff sent to pharmacy and pt instructed to start checking BP daily and keep a long to bring to next visit.   Discussed plan to order 3T labs next visit    Next visit: 4 weeks    Reviewed:   Prenatal visit schedule  2nd trimester precautions and expectations   labor precautions  Kick counts  Danger signs      I have spent 20 minutes total time on the day of the encounter, including: preparing to see the patient, ordering/reviewing labs, performing a medically appropriate exam, and providing care coordination. face to face counseling, chart review, orders and coordination of care    Pt verbalized understanding. All questions answered. No barriers to learning identified

## 2025-04-02 NOTE — PATIENT INSTRUCTIONS
Understanding  Labor  Going into labor before your 37th week of pregnancy is called  labor.  labor can cause your baby to be born too soon. This can lead to a number of health problems that may affect your baby.      Before labor, the cervix is thick and closed.      In  labor, the cervix begins to efface (thin) and dilate (open).   Symptoms of  labor   If you think you’re having  labor, get medical help right away. Contractions alone don’t mean you’re in  labor. What matters more are changes in your cervix (the lower end of the uterus). Symptoms of  labor include:   Four or more contractions per hour  Strong contractions  Constant menstrual-like cramping  Low-back pain  Mucous or bloody vaginal discharge  Bleeding or spotting in the second or third trimester  Evaluating  labor   Your healthcare provider will try to find out whether you’re in  labor or whether you’re just having contractions. He or she may watch you for a few hours. The following tests may be done:   Pelvic exam to see if your cervix has effaced (thinned) and dilated (opened)  Uterine activity monitoring to detect contractions  Fetal monitoring to check the health of your baby  Ultrasound to check your baby’s size and position  Amniocentesis to check how mature your baby’s lungs are  Caring for yourself at home   If you have  contractions, but your cervix is still thick and closed, your healthcare provider may ask you to do the following at home:   Drink plenty of water.  Do fewer activities.  Rest in bed on your side.  Don't have intercourse or nipple stimulation.  When to call your healthcare provider   Call your healthcare provider if you notice any of these:   Four or more contractions per hour  Bag of water breaks  Bleeding or spotting  If you need hospital care    labor often requires that you have hospital care and complete bed rest. You may have an IV  (intravenous) line to get fluids. You may be given pills or injections to help prevent contractions. Finally, you may get medicine (corticosteroids) that helps your baby’s lungs mature more quickly.   Are you at risk?   Any pregnant woman can have  labor. It may start for no reason. But these risk factors can increase your chances:   Past  labor or past early birth  Smoking, drug, or alcohol use during pregnancy  Multiple fetuses (twins or more)  Problems with the shape of the uterus  Bleeding during the pregnancy  The dangers of  birth   A baby born too soon may have health problems. This is because the baby didn’t have enough time to mature. Some of the risks for your baby include:   Not breastfeeding or feeding well  Having immature lungs  Bleeding in the brain  Dying  Reaching term   Your goal is to get as close to term as you can before giving birth. The closer you get to term, the higher your chance of having a healthy baby. Work with your healthcare provider. Together, you can take steps that may keep you from giving birth too early.   [employer] last reviewed this educational content on 3/1/2019  © 2451-9582 [employer]. [address] [place] [address]. All rights reserved. This information is not intended as a substitute for professional medical care. Always follow your healthcare professional's instructions.        Premature Labor    Premature labor ( labor) is when symptoms of labor occur before 37 weeks of pregnancy. (This is 3 weeks before your due date.) Premature labor can lead to premature delivery. This means giving birth to your baby early. Babies need at least 37 weeks of pregnancy for all the organs to develop normally. The earlier the delivery, the greater the risks to the baby.  In most cases, the cause of premature labor is unknown. But certain factors may make the problem more likely. These include:  History of premature labor with other  pregnancies  Smoking  Alcohol or substance abuse  Low pre-pregnancy weight or weight gain during pregnancy  Short time period between pregnancies  Being pregnant with twins, triplets, or more  History of certain types of surgery on the cervix or uterus  Having a short cervix  Certain infections  There are a number of other risk factors. Ask your healthcare provider to help you understand the risk factors specific to your case. Then find out what you can do to control or reduce them.  Contractions are one of the main signs of premature labor. A contraction is different from cramping. It may feel painful and the belly (abdomen) may get hard. It can last from a few seconds to a few minutes. Some women may feel only a sense of pressure in the belly, thighs, rectum, or vagina. Some may feel only the hardening of the uterus without pain or pressure. Or there may be a constant pain in the lower back, which spreads forward toward the belly.Premature labor is often treated with medicines. A hospital stay may be needed. If labor doesn't progress and you and your baby are both healthy, you may be discharged to continue care at home.  Home care  Ask your provider any questions you have. Be certain you understand how to care for yourself at home. Also follow all recommendations given by your healthcare providers.  Learn the signs of premature labor. Watch for these signs when you get home.  Limit or restrict activities as advised. This may include stopping certain physical activities and cutting back hours at work.  Avoid doing strenuous work as directed by your provider. Ask family and friends for help with tasks and support at home, if needed.  Don’t smoke, drink alcohol, or use other harmful substances.  Take steps to reduce stress.  Report any unusual symptoms to your provider.    Follow-up care  Follow up with your healthcare provider, or as directed. Weekly visits with your provider may be needed.  When to seek medical  advice  Call your healthcare provider right away if any of these occur:  Regular or frequent contractions, whether they are painful or not  Pressure in the pelvis  Pressure in the lower belly or mild cramping in your belly with or without diarrhea  Constant low, dull backache  Gush or slow leaking of water from your vagina  Change in vaginal discharge (watery, mucus, or bloody)  Any vaginal bleeding  Decreased movement of your baby  [employer] last reviewed this educational content on 6/1/2018 © 2000-2020 The [employer]. [address] [place] [address]. All rights reserved. This information is not intended as a substitute for professional medical care. Always follow your healthcare professional's instructions.        Understanding Preeclampsia  Preeclampsia is high blood pressure (hypertension) that happens during pregnancy. It often shows up around the 20th week of pregnancy. It often goes back to normal by the 12th week after you give birth. It can lead to serious health risks for you and your baby. During your pregnancy, your healthcare provider will watch your blood pressure.    Symptoms  A common symptom of preeclampsia is high blood pressure. Other symptoms may include:  Rapid weight gain  Protein in your urine  Headache  Belly (abdominal) pain on your right side  Vision problems. These include flashes or spots.  Swelling (edema) in your face or hands. This also often happens near the end of normal pregnancies, even without preeclampsia.  Tests you may have  Your healthcare provider will want to check your blood pressure throughout your pregnancy. If your blood pressure is high, you may have the following tests:  Urine tests to look for protein  Blood tests to confirm preeclampsia  Fetal monitoring to make sure that your baby is healthy  Treating preeclampsia  You may need to take a daily low dose of aspirin if you are at risk for preeclampsia. Preeclampsia almost always ends soon after you  give birth. Until then, your healthcare provider can help manage your condition. If your symptoms are mild, you may need activity limits at home, including bed rest and no heavy lifting. If your symptoms are severe, you will stay in the hospital. Hospital treatment includes:  Activity limits to help control blood pressure. This means no heavy lifting and 8 hours per day lying down with the feet up.  Magnesium IV (intravenous) drip during labor to prevent seizures  Induced labor or surgical delivery by  section. Delivery is considered the cure for preeclampsia.  When to call your healthcare provider  Call your healthcare provider if swelling, weight gain, or other symptoms come on quickly or are severe. Some cases of preeclampsia are more severe than others. Your symptoms also may change or get worse as you get closer to your due date.  Who’s at risk?  No one knows what causes preeclampsia. Preeclampsia can happen in any pregnant woman. But it is more common in first-time pregnancies. Things that increase the risk include:  Previous pregnancies. You are at risk if you had preeclampsia, intrauterine growth retardation (IUGR),  birth, placental abruption, or fetal death in a past pregnancy.  Health history of mother. You are at risk if you have diabetes, high blood pressure, obesity, kidney disease, autoimmune disease such as lupus, or a family history of preeclampsia.  Current pregnancy. You are at risk if this is your first pregnancy, or if you have multiple fetuses, are younger than age 18 or older than 40, or used in vitro fertilization.  Race. You are at risk if you are black.  Dangers of preeclampsia  If not treated, preeclampsia can cause problems for you and your baby. The placenta is the organ that nourishes your baby. It may tear away from the uterine wall. This can put the baby at risk for health problems (fetal distress) and premature birth. Preeclampsia can also cause these health  problems:  Kidney failure or other organ damage  Seizures  Stroke  Once you give birth  In most cases, preeclampsia goes away on its own soon after you give birth. This is often by the 12th week after you give deliver. Within days of delivery, your blood pressure, swelling, and other symptoms should get better. For some women, problems from preeclampsia can continue after delivery.  Postpartum preeclampsia that develops within the first 48 hours after delivery is rare. Another type of postpartum preeclampsia that develops more than 48 hours after delivery is called late-onset preeclampsia. It is also rare. Contact your healthcare provider right away if you have symptoms of preeclampsia after you deliver.  [employer] last reviewed this educational content on 12/1/2019 © 2000-2020 [employer]. [address] [place] [address], [place] [address]. All rights reserved. This information is not intended as a substitute for professional medical care. Always follow your healthcare professional's instructions.        Kick Counts    It’s normal to worry about your baby’s health. One way you can know your baby’s doing well is to record the baby’s movements once a day. This is called a kick count. Remember to take your kick count records to all your appointments with your healthcare provider.  How to count kicks  Time how long it takes you to feel 10 kicks, flutters, swishes, or rolls. Ideally, you want to feel at least 10 movements within 2 hours. You will likely feel 10 movements in less time than that.  Starting at 28 weeks, count your baby's movements daily. Follow your healthcare provider's instructions for kick counting. Here are tips for counting kicks:  Choose a time when the baby is active, such as after a meal.   Sit comfortably or lie on your side.   The first time the baby moves, write down the time.   Count each movement until the baby has moved 10 times. This can take from 20 minutes to 2 hours.   If you have  not felt 10 kicks by the end of the second hour, wait a few hours. Then try again.  Try to do it at the same time each day.  When to call your healthcare provider  Call your healthcare provider right away if:  You do a couple sets of kick counts during the day and your baby moves fewer than 10 times in 2 hours  Your baby moves much less often than on the days before.  You have not felt your baby move all day.  [employer] last reviewed this educational content on 12/1/2017 © 2000-2020 [employer]. [address] [place] [id number]. All rights reserved. This information is not intended as a substitute for professional medical care. Always follow your healthcare professional's instructions.

## 2025-05-01 ENCOUNTER — ROUTINE PRENATAL (OUTPATIENT)
Dept: OBGYN CLINIC | Facility: CLINIC | Age: 25
End: 2025-05-01
Payer: COMMERCIAL

## 2025-05-01 VITALS
BODY MASS INDEX: 29 KG/M2 | DIASTOLIC BLOOD PRESSURE: 70 MMHG | SYSTOLIC BLOOD PRESSURE: 116 MMHG | WEIGHT: 170.38 LBS | HEART RATE: 108 BPM

## 2025-05-01 DIAGNOSIS — Z13.0 SCREENING, ANEMIA, DEFICIENCY, IRON: ICD-10-CM

## 2025-05-01 DIAGNOSIS — Z13.1 SCREENING FOR DIABETES MELLITUS: ICD-10-CM

## 2025-05-01 DIAGNOSIS — Z11.3 SCREENING EXAMINATION FOR STI: ICD-10-CM

## 2025-05-01 DIAGNOSIS — Z34.92 PRENATAL CARE, SECOND TRIMESTER (HCC): Primary | ICD-10-CM

## 2025-05-01 PROCEDURE — 3078F DIAST BP <80 MM HG: CPT | Performed by: ADVANCED PRACTICE MIDWIFE

## 2025-05-01 PROCEDURE — 3074F SYST BP LT 130 MM HG: CPT | Performed by: ADVANCED PRACTICE MIDWIFE

## 2025-05-08 ENCOUNTER — LAB ENCOUNTER (OUTPATIENT)
Dept: LAB | Facility: HOSPITAL | Age: 25
End: 2025-05-08
Attending: ADVANCED PRACTICE MIDWIFE
Payer: COMMERCIAL

## 2025-05-08 DIAGNOSIS — Z34.92 PRENATAL CARE, SECOND TRIMESTER (HCC): ICD-10-CM

## 2025-05-08 DIAGNOSIS — Z13.1 SCREENING FOR DIABETES MELLITUS: ICD-10-CM

## 2025-05-08 DIAGNOSIS — Z11.3 SCREENING EXAMINATION FOR STI: ICD-10-CM

## 2025-05-08 DIAGNOSIS — Z13.0 SCREENING, ANEMIA, DEFICIENCY, IRON: ICD-10-CM

## 2025-05-08 LAB
DEPRECATED RDW RBC AUTO: 40.8 FL (ref 35.1–46.3)
ERYTHROCYTE [DISTWIDTH] IN BLOOD BY AUTOMATED COUNT: 11.9 % (ref 11–15)
GLUCOSE 1H P GLC SERPL-MCNC: 131 MG/DL (ref 70–130)
HCT VFR BLD AUTO: 30.2 % (ref 35–48)
HGB BLD-MCNC: 9.7 G/DL (ref 12–16)
MCH RBC QN AUTO: 30.1 PG (ref 26–34)
MCHC RBC AUTO-ENTMCNC: 32.1 G/DL (ref 31–37)
MCV RBC AUTO: 93.8 FL (ref 80–100)
PLATELET # BLD AUTO: 328 10(3)UL (ref 150–450)
RBC # BLD AUTO: 3.22 X10(6)UL (ref 3.8–5.3)
T PALLIDUM AB SER QL IA: NONREACTIVE
WBC # BLD AUTO: 10.8 X10(3) UL (ref 4–11)

## 2025-05-08 PROCEDURE — 85027 COMPLETE CBC AUTOMATED: CPT | Performed by: ADVANCED PRACTICE MIDWIFE

## 2025-05-08 PROCEDURE — 87389 HIV-1 AG W/HIV-1&-2 AB AG IA: CPT | Performed by: ADVANCED PRACTICE MIDWIFE

## 2025-05-08 PROCEDURE — 86780 TREPONEMA PALLIDUM: CPT | Performed by: ADVANCED PRACTICE MIDWIFE

## 2025-05-08 PROCEDURE — 82950 GLUCOSE TEST: CPT | Performed by: ADVANCED PRACTICE MIDWIFE

## 2025-05-09 PROBLEM — O99.019 ANEMIA, ANTEPARTUM (HCC): Status: ACTIVE | Noted: 2025-05-09

## 2025-05-12 ENCOUNTER — TELEPHONE (OUTPATIENT)
Dept: OBGYN CLINIC | Facility: CLINIC | Age: 25
End: 2025-05-12

## 2025-05-12 DIAGNOSIS — O99.013 ANEMIA DURING PREGNANCY IN THIRD TRIMESTER (HCC): Primary | ICD-10-CM

## 2025-05-12 DIAGNOSIS — R73.09 ELEVATED GLUCOSE TOLERANCE TEST: ICD-10-CM

## 2025-05-12 RX ORDER — FERROUS SULFATE 325(65) MG
325 TABLET ORAL EVERY OTHER DAY
Qty: 45 TABLET | Refills: 1 | Status: SHIPPED | OUTPATIENT
Start: 2025-05-12

## 2025-05-12 NOTE — TELEPHONE ENCOUNTER
----- Message from Delores Kirk sent at 5/9/2025  4:28 PM CDT -----  Call pt 1 hr glucose is abnormal she will need a 3 hr GTT and HAIC  Please order.  Also she is anemic add iron  325mg daily  She needs to repeat the CBC in 2-4 weeks if HGB above 10 that's good if   still below 10 will recommend iron infusion   Please order CBC as well  ----- Message -----  From: Lab, Background User  Sent: 5/8/2025   9:41 AM CDT  To: Delores Kirk CNM

## 2025-05-15 ENCOUNTER — ROUTINE PRENATAL (OUTPATIENT)
Dept: OBGYN CLINIC | Facility: CLINIC | Age: 25
End: 2025-05-15
Payer: COMMERCIAL

## 2025-05-15 VITALS
WEIGHT: 174 LBS | DIASTOLIC BLOOD PRESSURE: 78 MMHG | BODY MASS INDEX: 30 KG/M2 | HEART RATE: 90 BPM | SYSTOLIC BLOOD PRESSURE: 129 MMHG

## 2025-05-15 DIAGNOSIS — Z34.93 PRENATAL CARE, THIRD TRIMESTER (HCC): ICD-10-CM

## 2025-05-15 DIAGNOSIS — Z23 NEED FOR VACCINATION: Primary | ICD-10-CM

## 2025-05-15 PROCEDURE — 90715 TDAP VACCINE 7 YRS/> IM: CPT | Performed by: ADVANCED PRACTICE MIDWIFE

## 2025-05-15 PROCEDURE — 90471 IMMUNIZATION ADMIN: CPT | Performed by: ADVANCED PRACTICE MIDWIFE

## 2025-05-15 PROCEDURE — 3074F SYST BP LT 130 MM HG: CPT | Performed by: ADVANCED PRACTICE MIDWIFE

## 2025-05-15 PROCEDURE — 3078F DIAST BP <80 MM HG: CPT | Performed by: ADVANCED PRACTICE MIDWIFE

## 2025-05-15 NOTE — PROGRESS NOTES
Feeling well. Endorses regular fetal movement. Denies contractions, LOF, vaginal bleeding.     1hr GTT elevated. 3hr scheduled for scheduled for next Thursday  Patient anemic. She has started iron supplements. She reports she has a hard time taking pills. Recommend floradix. Plan to recheck iron level in 4 weeks.    Immunizations: TDAP      Plan:  PASTOR 2 weeks    Reviewed third trimester and  labor warning signs and when to call.

## 2025-05-22 ENCOUNTER — LAB ENCOUNTER (OUTPATIENT)
Dept: LAB | Facility: HOSPITAL | Age: 25
End: 2025-05-22
Attending: ADVANCED PRACTICE MIDWIFE
Payer: COMMERCIAL

## 2025-05-22 DIAGNOSIS — O99.013 ANEMIA DURING PREGNANCY IN THIRD TRIMESTER (HCC): ICD-10-CM

## 2025-05-22 DIAGNOSIS — R73.09 ELEVATED GLUCOSE TOLERANCE TEST: ICD-10-CM

## 2025-05-22 LAB
DEPRECATED RDW RBC AUTO: 40.9 FL (ref 35.1–46.3)
ERYTHROCYTE [DISTWIDTH] IN BLOOD BY AUTOMATED COUNT: 12.5 % (ref 11–15)
EST. AVERAGE GLUCOSE BLD GHB EST-MCNC: 117 MG/DL (ref 68–126)
GLUCOSE 1H P GLC SERPL-MCNC: 139 MG/DL (ref 70–179)
GLUCOSE 2H P GLC SERPL-MCNC: 127 MG/DL (ref 70–154)
GLUCOSE 3H P GLC SERPL-MCNC: 74 MG/DL (ref 70–140)
GLUCOSE P FAST SERPL-MCNC: 83 MG/DL (ref 70–94)
HBA1C MFR BLD: 5.7 % (ref ?–5.7)
HCT VFR BLD AUTO: 32.2 % (ref 35–48)
HGB BLD-MCNC: 9.9 G/DL (ref 12–16)
MCH RBC QN AUTO: 28 PG (ref 26–34)
MCHC RBC AUTO-ENTMCNC: 30.7 G/DL (ref 31–37)
MCV RBC AUTO: 91.2 FL (ref 80–100)
PLATELET # BLD AUTO: 382 10(3)UL (ref 150–450)
RBC # BLD AUTO: 3.53 X10(6)UL (ref 3.8–5.3)
WBC # BLD AUTO: 12.5 X10(3) UL (ref 4–11)

## 2025-05-22 PROCEDURE — 83036 HEMOGLOBIN GLYCOSYLATED A1C: CPT | Performed by: ADVANCED PRACTICE MIDWIFE

## 2025-05-22 PROCEDURE — 85027 COMPLETE CBC AUTOMATED: CPT | Performed by: ADVANCED PRACTICE MIDWIFE

## 2025-05-22 PROCEDURE — 82951 GLUCOSE TOLERANCE TEST (GTT): CPT | Performed by: ADVANCED PRACTICE MIDWIFE

## 2025-05-22 PROCEDURE — 82952 GTT-ADDED SAMPLES: CPT | Performed by: ADVANCED PRACTICE MIDWIFE

## 2025-05-27 NOTE — PROGRESS NOTES
Melissa, , is at 31w3d, here for her PASTOR visit.  Currently, she is feeling well. Denies 3rd trimester danger signs.   Good FM, no VB or LOF, no UCs  Denies HA, vision changes or RUQ pain  Taking Bps at home 107/70, 112/67, 120/66 this week, checking daily    Hgb 9.9-taking Fe liquid, Floradix      Assessment/Plan:   31 wk, s=d, good FM  Reviewed 53# twg, will do growth at 32 wk  Taking Fe, will recheck 4 wk as last 9.9  Initial BP elev with manual repeat normotensive, all Bps at home normal, asymptomatic, agrees to do baseline preE labs today  Next visit: 2 wk    Reviewed:   Prenatal visit schedule  CNM care  Emergency contact info and safe use of messaging in MyChart  3rd trimester precautions and expectations   labor precautions  Kick counts  Danger signs      I have spent 20 minutes total time on the day of the encounter, including: preparing to see the patient, ordering/reviewing labs, performing a medically appropriate exam, and providing care coordination. face to face counseling, chart review, orders and coordination of care    Pt verbalized understanding. All questions answered. No barriers to learning identified

## 2025-05-29 ENCOUNTER — LAB ENCOUNTER (OUTPATIENT)
Dept: LAB | Facility: HOSPITAL | Age: 25
End: 2025-05-29
Attending: ADVANCED PRACTICE MIDWIFE
Payer: COMMERCIAL

## 2025-05-29 ENCOUNTER — TELEPHONE (OUTPATIENT)
Dept: OBGYN CLINIC | Facility: CLINIC | Age: 25
End: 2025-05-29

## 2025-05-29 ENCOUNTER — ROUTINE PRENATAL (OUTPATIENT)
Dept: OBGYN CLINIC | Facility: CLINIC | Age: 25
End: 2025-05-29
Payer: COMMERCIAL

## 2025-05-29 VITALS
BODY MASS INDEX: 30 KG/M2 | DIASTOLIC BLOOD PRESSURE: 82 MMHG | HEART RATE: 99 BPM | WEIGHT: 173 LBS | SYSTOLIC BLOOD PRESSURE: 124 MMHG

## 2025-05-29 DIAGNOSIS — R03.0 ELEVATED BLOOD PRESSURE READING IN OFFICE WITHOUT DIAGNOSIS OF HYPERTENSION: ICD-10-CM

## 2025-05-29 DIAGNOSIS — O26.03 EXCESS WEIGHT GAIN IN PREGNANCY, THIRD TRIMESTER (HCC): Primary | ICD-10-CM

## 2025-05-29 LAB
ALBUMIN SERPL-MCNC: 3.7 G/DL (ref 3.2–4.8)
ALBUMIN/GLOB SERPL: 1.5 {RATIO} (ref 1–2)
ALP LIVER SERPL-CCNC: 145 U/L (ref 52–144)
ALT SERPL-CCNC: 18 U/L (ref 10–49)
ANION GAP SERPL CALC-SCNC: 8 MMOL/L (ref 0–18)
AST SERPL-CCNC: 22 U/L (ref ?–34)
BILIRUB SERPL-MCNC: 0.4 MG/DL (ref 0.3–1.2)
BUN BLD-MCNC: 8 MG/DL (ref 9–23)
BUN/CREAT SERPL: 13.1 (ref 10–20)
CALCIUM BLD-MCNC: 8.6 MG/DL (ref 8.7–10.4)
CHLORIDE SERPL-SCNC: 104 MMOL/L (ref 98–112)
CO2 SERPL-SCNC: 24 MMOL/L (ref 21–32)
CREAT BLD-MCNC: 0.61 MG/DL (ref 0.55–1.02)
CREAT UR-SCNC: 142.8 MG/DL
DEPRECATED RDW RBC AUTO: 41.3 FL (ref 35.1–46.3)
EGFRCR SERPLBLD CKD-EPI 2021: 129 ML/MIN/1.73M2 (ref 60–?)
ERYTHROCYTE [DISTWIDTH] IN BLOOD BY AUTOMATED COUNT: 12.7 % (ref 11–15)
FASTING STATUS PATIENT QL REPORTED: NO
GLOBULIN PLAS-MCNC: 2.5 G/DL (ref 2–3.5)
GLUCOSE BLD-MCNC: 89 MG/DL (ref 70–99)
HCT VFR BLD AUTO: 29.7 % (ref 35–48)
HGB BLD-MCNC: 9.5 G/DL (ref 12–16)
MCH RBC QN AUTO: 29.1 PG (ref 26–34)
MCHC RBC AUTO-ENTMCNC: 32 G/DL (ref 31–37)
MCV RBC AUTO: 91.1 FL (ref 80–100)
OSMOLALITY SERPL CALC.SUM OF ELEC: 280 MOSM/KG (ref 275–295)
PLATELET # BLD AUTO: 374 10(3)UL (ref 150–450)
POTASSIUM SERPL-SCNC: 3.8 MMOL/L (ref 3.5–5.1)
PROT SERPL-MCNC: 6.2 G/DL (ref 5.7–8.2)
PROT UR-MCNC: 20.6 MG/DL (ref ?–14)
PROT/CREAT UR-RTO: 0.14
RBC # BLD AUTO: 3.26 X10(6)UL (ref 3.8–5.3)
SODIUM SERPL-SCNC: 136 MMOL/L (ref 136–145)
WBC # BLD AUTO: 12.4 X10(3) UL (ref 4–11)

## 2025-05-29 PROCEDURE — 3074F SYST BP LT 130 MM HG: CPT | Performed by: ADVANCED PRACTICE MIDWIFE

## 2025-05-29 PROCEDURE — 82570 ASSAY OF URINE CREATININE: CPT | Performed by: ADVANCED PRACTICE MIDWIFE

## 2025-05-29 PROCEDURE — 80053 COMPREHEN METABOLIC PANEL: CPT | Performed by: ADVANCED PRACTICE MIDWIFE

## 2025-05-29 PROCEDURE — 84156 ASSAY OF PROTEIN URINE: CPT | Performed by: ADVANCED PRACTICE MIDWIFE

## 2025-05-29 PROCEDURE — 3079F DIAST BP 80-89 MM HG: CPT | Performed by: ADVANCED PRACTICE MIDWIFE

## 2025-05-29 PROCEDURE — 85027 COMPLETE CBC AUTOMATED: CPT | Performed by: ADVANCED PRACTICE MIDWIFE

## 2025-05-29 NOTE — TELEPHONE ENCOUNTER
Incoming fax received from [email or web address] with breast pump order. Order signed and faxed to number provided.

## 2025-06-05 ENCOUNTER — ULTRASOUND ENCOUNTER (OUTPATIENT)
Dept: OBGYN CLINIC | Facility: CLINIC | Age: 25
End: 2025-06-05
Payer: COMMERCIAL

## 2025-06-05 DIAGNOSIS — O26.03 EXCESS WEIGHT GAIN IN PREGNANCY, THIRD TRIMESTER (HCC): ICD-10-CM

## 2025-06-09 ENCOUNTER — ROUTINE PRENATAL (OUTPATIENT)
Dept: OBGYN CLINIC | Facility: CLINIC | Age: 25
End: 2025-06-09
Payer: COMMERCIAL

## 2025-06-09 VITALS
SYSTOLIC BLOOD PRESSURE: 117 MMHG | DIASTOLIC BLOOD PRESSURE: 82 MMHG | HEART RATE: 90 BPM | WEIGHT: 187 LBS | BODY MASS INDEX: 32 KG/M2

## 2025-06-09 DIAGNOSIS — Z34.83 PRENATAL CARE, SUBSEQUENT PREGNANCY IN THIRD TRIMESTER (HCC): Primary | ICD-10-CM

## 2025-06-09 DIAGNOSIS — O99.019: ICD-10-CM

## 2025-06-09 DIAGNOSIS — O26.03 EXCESS WEIGHT GAIN IN PREGNANCY, THIRD TRIMESTER (HCC): ICD-10-CM

## 2025-06-09 NOTE — PROGRESS NOTES
Melissa, , is at 33w0d, here for her PASTOR visit.  Currently, she is feeling well. Denies 3rd trimester danger signs.   Taking her iron supplement as prescribed. Reports she did not weigh 120 lbs prior to pregnancy. Thinks it was more like 130-135 lbs.  Eating 3 square meals/day and small snacks. Eating healthy. Not sure why she is gaining so much weight.     Vital signs and weight reviewed  See flowsheets    The patient's medical, surgical, family, social, and medication histories have been reviewed. See respective tabs for documentation.     Assessment/Plan:   Excessive weight gain: Reviewed diet and weight gain recs. Pre-preg weight adjusted  Anemia: Reviewed how to take. Plan for repeat CBC at next visit  Next visit: 2 weeks. CBC then    Reviewed:   Prenatal visit schedule   labor precautions  Kick counts  Danger signs    Pt verbalized understanding. All questions answered. No barriers to learning identified

## 2025-06-09 NOTE — PATIENT INSTRUCTIONS
Adapting to Pregnancy: Third Trimester    Although common during pregnancy, some discomforts may seem worse in the final weeks. Simple lifestyle changes can help. Take care of yourself. And ask your partner to help out with small tasks.  Limiting leg problems  Ways to combat leg issues:  Wear support hose all day.  Avoid snug shoes and clothes that bind, like tight pants and socks with elastic tops.  Sit with your feet and legs raised often.  Caring for your breasts  Tips to follow include:  Wash with plain water. Avoid using harsh soaps or rubbing alcohol. They may cause dryness.  Wear a nursing bra for extra support. It can also hide any leaks from your nipples.  Controlling hemorrhoids  Ways to avoid hemorrhoids include:  Eat foods that are high in fiber. Also, exercise and drink enough fluids. This will reduce constipation and hemorrhoids.  Sleep and nap on your side. This limits pressure on the veins of your rectum.  Try not to stand or sit for long periods.  Controlling back pain  As your body changes during pregnancy, your back must work in new ways. Back pain is due to many causes. Physical changes in your body can strain your back and its supporting muscles. Also, hormones (chemicals that carry messages throughout the body) increase during pregnancy. This can affect how your muscles and joints work together. All of these changes can lead to pain. Pain may be felt in the upper or lower back. Pain is also common in the pelvis. Some pregnant women have sciatica. This is pain caused by pressure on the sciatic nerve running down the back of the leg. Ask your healthcare provider for specific tips and exercises to help control your back pain.  Tips to help you rest  Good rest and sleep will help you feel better. Here are some ideas:  Ask your partner to massage your shoulders, neck, or back.  Limit the errands you do each day.  Lie down in the afternoon or after work for a few minutes.  Take a warm bath before  you go to sleep.  Drink warm milk or teas without caffeine.  Avoid coffee, black tea, and cola.  Stopping heartburn  Avoid spicy, greasy, fried, or acidic foods.  Eat small amounts more often. Eat slowly. Wait 2 hours after eating before lying down.  Sleep with your upper body raised 6 inches.   Managing mood swings  Ways to manage mood swings include:  Know that mood changes are normal.  Exercise often, but get plenty of rest.  Address any concerns and limit stress. Talking to your partner, other women, or your healthcare provider may help.  Dealing with urinary frequency  Tips to deal with having to urinate often include:  Drink plenty of water all day. If you drink a lot in the evening, though, you may have to get up more in the night.  Limit coffee, black tea, and cola.  [employer] last reviewed this educational content on 2/1/2018 © 2000-2020 [employer]. [address] [place] [address]. All rights reserved. This information is not intended as a substitute for professional medical care. Always follow your healthcare professional's instructions.        Pregnancy: Your Third Trimester Changes  As the baby grows, your body changes too. You may also see signs that your body is getting ready for labor. Be patient. Within a few more weeks, your baby will be born.  How you are changing  Your body is preparing for the birth of your baby. Some of the most common changes are listed below. If you have any questions or concerns, ask your healthcare provider:  You’ll gain more weight from fluids, extra blood, and fat deposits.  Your breasts will grow as your body gets ready to feed the baby. They may be more tender. You may also notice a slight yellow or white discharge from the nipples.  Discharge from your vagina may increase. This is normal.  You might see some skin color changes on your forehead, cheeks, or nose. Most of these will go away after you deliver.  How your baby is growing       Month  7  Your baby can open and close his or her eyes and weighs around 4 pounds. If born prematurely (too early), your baby would likely survive with special care. Month 8  Your baby is building up body fat and weighs around 6 pounds. Month 9  Your baby weighs nearly 7 pounds and is about 19 to 21 inches long. In other words, any day now...   [employer] last reviewed this educational content on 2018 [employer]. [address] [place] [address], [place] [address]. All rights reserved. This information is not intended as a substitute for professional medical care. Always follow your healthcare professional's instructions.   Understanding  Labor  Going into labor before your 37th week of pregnancy is called  labor.  labor can cause your baby to be born too soon. This can lead to a number of health problems that may affect your baby.      Before labor, the cervix is thick and closed.      In  labor, the cervix begins to efface (thin) and dilate (open).   Symptoms of  labor   If you think you’re having  labor, get medical help right away. Contractions alone don’t mean you’re in  labor. What matters more are changes in your cervix (the lower end of the uterus). Symptoms of  labor include:   Four or more contractions per hour  Strong contractions  Constant menstrual-like cramping  Low-back pain  Mucous or bloody vaginal discharge  Bleeding or spotting in the second or third trimester  Evaluating  labor   Your healthcare provider will try to find out whether you’re in  labor or whether you’re just having contractions. He or she may watch you for a few hours. The following tests may be done:   Pelvic exam to see if your cervix has effaced (thinned) and dilated (opened)  Uterine activity monitoring to detect contractions  Fetal monitoring to check the health of your baby  Ultrasound to check your baby’s size and position  Amniocentesis to  check how mature your baby’s lungs are  Caring for yourself at home   If you have  contractions, but your cervix is still thick and closed, your healthcare provider may ask you to do the following at home:   Drink plenty of water.  Do fewer activities.  Rest in bed on your side.  Don't have intercourse or nipple stimulation.  When to call your healthcare provider   Call your healthcare provider if you notice any of these:   Four or more contractions per hour  Bag of water breaks  Bleeding or spotting  If you need hospital care    labor often requires that you have hospital care and complete bed rest. You may have an IV (intravenous) line to get fluids. You may be given pills or injections to help prevent contractions. Finally, you may get medicine (corticosteroids) that helps your baby’s lungs mature more quickly.   Are you at risk?   Any pregnant woman can have  labor. It may start for no reason. But these risk factors can increase your chances:   Past  labor or past early birth  Smoking, drug, or alcohol use during pregnancy  Multiple fetuses (twins or more)  Problems with the shape of the uterus  Bleeding during the pregnancy  The dangers of  birth   A baby born too soon may have health problems. This is because the baby didn’t have enough time to mature. Some of the risks for your baby include:   Not breastfeeding or feeding well  Having immature lungs  Bleeding in the brain  Dying  Reaching term   Your goal is to get as close to term as you can before giving birth. The closer you get to term, the higher your chance of having a healthy baby. Work with your healthcare provider. Together, you can take steps that may keep you from giving birth too early.   [email or web address] last reviewed this educational content on 3/1/2019  © 5832-1486 [employer]. [address] [place] [address], [place] [address]. All rights reserved. This information is not intended as a substitute for  professional medical care. Always follow your healthcare professional's instructions.        Premature Labor    Premature labor ( labor) is when symptoms of labor occur before 37 weeks of pregnancy. (This is 3 weeks before your due date.) Premature labor can lead to premature delivery. This means giving birth to your baby early. Babies need at least 37 weeks of pregnancy for all the organs to develop normally. The earlier the delivery, the greater the risks to the baby.  In most cases, the cause of premature labor is unknown. But certain factors may make the problem more likely. These include:  History of premature labor with other pregnancies  Smoking  Alcohol or substance abuse  Low pre-pregnancy weight or weight gain during pregnancy  Short time period between pregnancies  Being pregnant with twins, triplets, or more  History of certain types of surgery on the cervix or uterus  Having a short cervix  Certain infections  There are a number of other risk factors. Ask your healthcare provider to help you understand the risk factors specific to your case. Then find out what you can do to control or reduce them.  Contractions are one of the main signs of premature labor. A contraction is different from cramping. It may feel painful and the belly (abdomen) may get hard. It can last from a few seconds to a few minutes. Some women may feel only a sense of pressure in the belly, thighs, rectum, or vagina. Some may feel only the hardening of the uterus without pain or pressure. Or there may be a constant pain in the lower back, which spreads forward toward the belly.Premature labor is often treated with medicines. A hospital stay may be needed. If labor doesn't progress and you and your baby are both healthy, you may be discharged to continue care at home.  Home care  Ask your provider any questions you have. Be certain you understand how to care for yourself at home. Also follow all recommendations given by your  healthcare providers.  Learn the signs of premature labor. Watch for these signs when you get home.  Limit or restrict activities as advised. This may include stopping certain physical activities and cutting back hours at work.  Avoid doing strenuous work as directed by your provider. Ask family and friends for help with tasks and support at home, if needed.  Don’t smoke, drink alcohol, or use other harmful substances.  Take steps to reduce stress.  Report any unusual symptoms to your provider.    Follow-up care  Follow up with your healthcare provider, or as directed. Weekly visits with your provider may be needed.  When to seek medical advice  Call your healthcare provider right away if any of these occur:  Regular or frequent contractions, whether they are painful or not  Pressure in the pelvis  Pressure in the lower belly or mild cramping in your belly with or without diarrhea  Constant low, dull backache  Gush or slow leaking of water from your vagina  Change in vaginal discharge (watery, mucus, or bloody)  Any vaginal bleeding  Decreased movement of your baby  [employer] last reviewed this educational content on 6/1/2018 © 2000-2020 The [employer]. [address] [place], [place] [id number]. All rights reserved. This information is not intended as a substitute for professional medical care. Always follow your healthcare professional's instructions.        Understanding Preeclampsia  Preeclampsia is high blood pressure (hypertension) that happens during pregnancy. It often shows up around the 20th week of pregnancy. It often goes back to normal by the 12th week after you give birth. It can lead to serious health risks for you and your baby. During your pregnancy, your healthcare provider will watch your blood pressure.    Symptoms  A common symptom of preeclampsia is high blood pressure. Other symptoms may include:  Rapid weight gain  Protein in your urine  Headache  Belly (abdominal) pain on your  right side  Vision problems. These include flashes or spots.  Swelling (edema) in your face or hands. This also often happens near the end of normal pregnancies, even without preeclampsia.  Tests you may have  Your healthcare provider will want to check your blood pressure throughout your pregnancy. If your blood pressure is high, you may have the following tests:  Urine tests to look for protein  Blood tests to confirm preeclampsia  Fetal monitoring to make sure that your baby is healthy  Treating preeclampsia  You may need to take a daily low dose of aspirin if you are at risk for preeclampsia. Preeclampsia almost always ends soon after you give birth. Until then, your healthcare provider can help manage your condition. If your symptoms are mild, you may need activity limits at home, including bed rest and no heavy lifting. If your symptoms are severe, you will stay in the hospital. Hospital treatment includes:  Activity limits to help control blood pressure. This means no heavy lifting and 8 hours per day lying down with the feet up.  Magnesium IV (intravenous) drip during labor to prevent seizures  Induced labor or surgical delivery by  section. Delivery is considered the cure for preeclampsia.  When to call your healthcare provider  Call your healthcare provider if swelling, weight gain, or other symptoms come on quickly or are severe. Some cases of preeclampsia are more severe than others. Your symptoms also may change or get worse as you get closer to your due date.  Who’s at risk?  No one knows what causes preeclampsia. Preeclampsia can happen in any pregnant woman. But it is more common in first-time pregnancies. Things that increase the risk include:  Previous pregnancies. You are at risk if you had preeclampsia, intrauterine growth retardation (IUGR),  birth, placental abruption, or fetal death in a past pregnancy.  Health history of mother. You are at risk if you have diabetes, high blood  pressure, obesity, kidney disease, autoimmune disease such as lupus, or a family history of preeclampsia.  Current pregnancy. You are at risk if this is your first pregnancy, or if you have multiple fetuses, are younger than age 18 or older than 40, or used in vitro fertilization.  Race. You are at risk if you are black.  Dangers of preeclampsia  If not treated, preeclampsia can cause problems for you and your baby. The placenta is the organ that nourishes your baby. It may tear away from the uterine wall. This can put the baby at risk for health problems (fetal distress) and premature birth. Preeclampsia can also cause these health problems:  Kidney failure or other organ damage  Seizures  Stroke  Once you give birth  In most cases, preeclampsia goes away on its own soon after you give birth. This is often by the 12th week after you give deliver. Within days of delivery, your blood pressure, swelling, and other symptoms should get better. For some women, problems from preeclampsia can continue after delivery.  Postpartum preeclampsia that develops within the first 48 hours after delivery is rare. Another type of postpartum preeclampsia that develops more than 48 hours after delivery is called late-onset preeclampsia. It is also rare. Contact your healthcare provider right away if you have symptoms of preeclampsia after you deliver.  [employer] last reviewed this educational content on 12/1/2019  © 8778-0357 [employer]. [address] [place] [address], [place] [address]. All rights reserved. This information is not intended as a substitute for professional medical care. Always follow your healthcare professional's instructions.        Kick Counts    It’s normal to worry about your baby’s health. One way you can know your baby’s doing well is to record the baby’s movements once a day. This is called a kick count. Remember to take your kick count records to all your appointments with your healthcare provider.  How  to count kicks  Time how long it takes you to feel 10 kicks, flutters, swishes, or rolls. Ideally, you want to feel at least 10 movements within 2 hours. You will likely feel 10 movements in less time than that.  Starting at 28 weeks, count your baby's movements daily. Follow your healthcare provider's instructions for kick counting. Here are tips for counting kicks:  Choose a time when the baby is active, such as after a meal.   Sit comfortably or lie on your side.   The first time the baby moves, write down the time.   Count each movement until the baby has moved 10 times. This can take from 20 minutes to 2 hours.   If you have not felt 10 kicks by the end of the second hour, wait a few hours. Then try again.  Try to do it at the same time each day.  When to call your healthcare provider  Call your healthcare provider right away if:  You do a couple sets of kick counts during the day and your baby moves fewer than 10 times in 2 hours  Your baby moves much less often than on the days before.  You have not felt your baby move all day.  [employer] last reviewed this educational content on 12/1/2017  © 2634-9009 [employer] [address] [place] [address], [place] [address]. All rights reserved. This information is not intended as a substitute for professional medical care. Always follow your healthcare professional's instructions.

## 2025-06-11 ENCOUNTER — TELEPHONE (OUTPATIENT)
Dept: OBGYN CLINIC | Facility: CLINIC | Age: 25
End: 2025-06-11

## 2025-06-11 DIAGNOSIS — O99.810 PREDIABETES IN MOTHER DURING PREGNANCY (HCC): Primary | ICD-10-CM

## 2025-06-11 PROBLEM — R73.03 PRE-DIABETES: Status: ACTIVE | Noted: 2025-06-11

## 2025-06-11 NOTE — TELEPHONE ENCOUNTER
LMTCB/ORDER FOR DIETICIAN REFERRAL PLACED FOR PT.     Delores Kirk, ÓSCAR  [facility] Clinical Pool  Passed 3 hour glucose but [facility] is borderline   recommend she follow a diabetic diet this pregnancy and retest 6-12 weeks postpartum for diabetes.  Please place order for dietician for pre-diabetes

## 2025-06-11 NOTE — TELEPHONE ENCOUNTER
Pt verified name and .     Informed pt of normal 3 hr GTT and elevated HgbA1C. Informed pt of CNM recommendation for diabetic diet and referral to dietician. Pt verbalized understanding and agreed. Phone number provided for scheduling.

## 2025-06-26 ENCOUNTER — ROUTINE PRENATAL (OUTPATIENT)
Dept: OBGYN CLINIC | Facility: CLINIC | Age: 25
End: 2025-06-26
Payer: COMMERCIAL

## 2025-06-26 VITALS
DIASTOLIC BLOOD PRESSURE: 75 MMHG | HEART RATE: 92 BPM | SYSTOLIC BLOOD PRESSURE: 133 MMHG | BODY MASS INDEX: 33 KG/M2 | WEIGHT: 191 LBS

## 2025-06-26 DIAGNOSIS — Z34.03 PRIMIGRAVIDA IN THIRD TRIMESTER (HCC): ICD-10-CM

## 2025-06-26 DIAGNOSIS — O99.019: Primary | ICD-10-CM

## 2025-06-26 PROCEDURE — 3075F SYST BP GE 130 - 139MM HG: CPT | Performed by: ADVANCED PRACTICE MIDWIFE

## 2025-06-26 PROCEDURE — 3078F DIAST BP <80 MM HG: CPT | Performed by: ADVANCED PRACTICE MIDWIFE

## 2025-06-26 NOTE — PROGRESS NOTES
Melissa, , is at 35w3d, here for her PASTOR visit.  Currently, she is feeling well. Denies 3rd trimester danger signs.   Good FM, No VB or LOF, no painful Ucs.    Taking Fe       Vital signs and weight reviewed      Assessment/Plan:  35 wk, s=d, good FM  CBC sent   High weight gain, 32 wk growth WNL    Next visit: 2 wk      Reviewed:   Prenatal visit schedule  CNM care  Emergency contact info and safe use of messaging in MyChart  3rd trimester precautions and expectations   labor precautions  Kick counts  Danger signs      Pt verbalized understanding. All questions answered. No barriers to learning identified

## 2025-07-09 ENCOUNTER — HOSPITAL ENCOUNTER (OUTPATIENT)
Facility: HOSPITAL | Age: 25
Discharge: HOME | End: 2025-07-09
Attending: ADVANCED PRACTICE MIDWIFE | Admitting: OBSTETRICS & GYNECOLOGY
Payer: COMMERCIAL

## 2025-07-09 ENCOUNTER — ROUTINE PRENATAL (OUTPATIENT)
Dept: OBGYN CLINIC | Facility: CLINIC | Age: 25
End: 2025-07-09
Payer: COMMERCIAL

## 2025-07-09 VITALS
WEIGHT: 195 LBS | SYSTOLIC BLOOD PRESSURE: 142 MMHG | BODY MASS INDEX: 33 KG/M2 | DIASTOLIC BLOOD PRESSURE: 88 MMHG | HEART RATE: 88 BPM

## 2025-07-09 VITALS — DIASTOLIC BLOOD PRESSURE: 74 MMHG | HEART RATE: 77 BPM | SYSTOLIC BLOOD PRESSURE: 129 MMHG

## 2025-07-09 DIAGNOSIS — Z34.93 PRENATAL CARE, THIRD TRIMESTER (HCC): Primary | ICD-10-CM

## 2025-07-09 DIAGNOSIS — R03.0 ELEVATED BLOOD PRESSURE READING: ICD-10-CM

## 2025-07-09 DIAGNOSIS — Z34.03 PRIMIGRAVIDA IN THIRD TRIMESTER (HCC): ICD-10-CM

## 2025-07-09 PROBLEM — O16.3 HYPERTENSION AFFECTING PREGNANCY IN THIRD TRIMESTER (HCC): Status: ACTIVE | Noted: 2025-07-09

## 2025-07-09 LAB
ALBUMIN SERPL-MCNC: 4.1 G/DL (ref 3.2–4.8)
ALBUMIN/GLOB SERPL: 1.6 {RATIO} (ref 1–2)
ALP LIVER SERPL-CCNC: 268 U/L (ref 52–144)
ALT SERPL-CCNC: 19 U/L (ref 10–49)
ANION GAP SERPL CALC-SCNC: 7 MMOL/L (ref 0–18)
AST SERPL-CCNC: 24 U/L (ref ?–34)
BASOPHILS # BLD AUTO: 0.04 X10(3) UL (ref 0–0.2)
BASOPHILS NFR BLD AUTO: 0.3 %
BILIRUB SERPL-MCNC: 0.4 MG/DL (ref 0.3–1.2)
BUN BLD-MCNC: 8 MG/DL (ref 9–23)
BUN/CREAT SERPL: 11.1 (ref 10–20)
CALCIUM BLD-MCNC: 9.6 MG/DL (ref 8.7–10.4)
CHLORIDE SERPL-SCNC: 105 MMOL/L (ref 98–112)
CO2 SERPL-SCNC: 23 MMOL/L (ref 21–32)
CREAT BLD-MCNC: 0.72 MG/DL (ref 0.55–1.02)
CREAT UR-SCNC: 39.1 MG/DL
DEPRECATED RDW RBC AUTO: 43.9 FL (ref 35.1–46.3)
EGFRCR SERPLBLD CKD-EPI 2021: 120 ML/MIN/1.73M2 (ref 60–?)
EOSINOPHIL # BLD AUTO: 0.03 X10(3) UL (ref 0–0.7)
EOSINOPHIL NFR BLD AUTO: 0.3 %
ERYTHROCYTE [DISTWIDTH] IN BLOOD BY AUTOMATED COUNT: 14.3 % (ref 11–15)
FASTING STATUS PATIENT QL REPORTED: NO
GLOBULIN PLAS-MCNC: 2.6 G/DL (ref 2–3.5)
GLUCOSE BLD-MCNC: 76 MG/DL (ref 70–99)
HCT VFR BLD AUTO: 31.6 % (ref 35–48)
HGB BLD-MCNC: 9.8 G/DL (ref 12–16)
IMM GRANULOCYTES # BLD AUTO: 0.06 X10(3) UL (ref 0–1)
IMM GRANULOCYTES NFR BLD: 0.5 %
LYMPHOCYTES # BLD AUTO: 2.07 X10(3) UL (ref 1–4)
LYMPHOCYTES NFR BLD AUTO: 17.5 %
MCH RBC QN AUTO: 26.5 PG (ref 26–34)
MCHC RBC AUTO-ENTMCNC: 31 G/DL (ref 31–37)
MCV RBC AUTO: 85.4 FL (ref 80–100)
MONOCYTES # BLD AUTO: 0.61 X10(3) UL (ref 0.1–1)
MONOCYTES NFR BLD AUTO: 5.2 %
NEUTROPHILS # BLD AUTO: 9.01 X10 (3) UL (ref 1.5–7.7)
NEUTROPHILS # BLD AUTO: 9.01 X10(3) UL (ref 1.5–7.7)
NEUTROPHILS NFR BLD AUTO: 76.2 %
OSMOLALITY SERPL CALC.SUM OF ELEC: 277 MOSM/KG (ref 275–295)
PLATELET # BLD AUTO: 373 10(3)UL (ref 150–450)
POTASSIUM SERPL-SCNC: 4.2 MMOL/L (ref 3.5–5.1)
PROT SERPL-MCNC: 6.7 G/DL (ref 5.7–8.2)
PROT UR-MCNC: <6 MG/DL (ref ?–14)
RBC # BLD AUTO: 3.7 X10(6)UL (ref 3.8–5.3)
SODIUM SERPL-SCNC: 135 MMOL/L (ref 136–145)
WBC # BLD AUTO: 11.8 X10(3) UL (ref 4–11)

## 2025-07-09 PROCEDURE — 3077F SYST BP >= 140 MM HG: CPT | Performed by: ADVANCED PRACTICE MIDWIFE

## 2025-07-09 PROCEDURE — 59025 FETAL NON-STRESS TEST: CPT | Performed by: ADVANCED PRACTICE MIDWIFE

## 2025-07-09 PROCEDURE — 3079F DIAST BP 80-89 MM HG: CPT | Performed by: ADVANCED PRACTICE MIDWIFE

## 2025-07-09 PROCEDURE — 99213 OFFICE O/P EST LOW 20 MIN: CPT | Performed by: ADVANCED PRACTICE MIDWIFE

## 2025-07-09 NOTE — PROGRESS NOTES
Active fetus Increasing BH contractions. Denies any leaking of fluid or bleeding. Denies h/a blurred vision or epigastric pain.   Pt to triage for further evaluation  GBS completed  Reviewed S&S labor  Warning signs reviewed  All questions answered.

## 2025-07-09 NOTE — PROGRESS NOTES
Pt is a 25 year old female admitted to TR2/TR2-A.     Chief Complaint   Patient presents with    Elevated BP     Pt sent from office for elevated BP. +FM.      Pt is  37w2d intra-uterine pregnancy.  History obtained, consents signed. Oriented to room, staff, and plan of care.

## 2025-07-09 NOTE — TRIAGE
[facility]  part of [facility]      Triage Note    Melissa Desai Patient Status:  Outpatient    3/27/2000 MRN [id number]   Location [facility] Attending Melissa Barillas CNM   Hosp Day # 0 PCP Cynthia Latham MD      Para:   Estimated Date of Delivery: 25  Gestation: 37w2d    Chief Complaint    Elevated BP         Allergies:  Allergies[1]    Orders Placed This Encounter   Procedures    Comp Metabolic Panel (14)    CBC With Differential With Platelet    Protein/Creatinine Ratio, Urine Random       Lab Results   Component Value Date    WBC 11.8 (H) 2025    HGB 9.8 (L) 2025    HCT 31.6 (L) 2025    .0 2025    CREATSERUM 0.72 2025    BUN 8 (L) 2025     (L) 2025    K 4.2 2025     2025    CO2 23.0 2025    GLU 76 2025    CA 9.6 2025    ALB 4.1 2025    ALKPHO 268 (H) 2025    BILT 0.4 2025    TP 6.7 2025    AST 24 2025    ALT 19 2025       Clinitek UA  Lab Results   Component Value Date    SPECGRAVITY 1.030 2019    URINECUL No Growth at 18-24 hrs. 2025       UA  Lab Results   Component Value Date    SPECGRAVITY 1.030 2019    NITRITE Positive 2019       Vitals:    25 1530 25 1545 25 1600 25 1615   BP: 124/74 132/82 115/68 129/74   Pulse: 66 74 71 77       NST  Variability: Moderate           Accelerations: Yes           Decelerations: None            Baseline: 130 BPM           Uterine Irritability: Yes           Contractions: Irregular                                        Acoustic Stimulator: No           Nonstress Test Interpretation: Reactive           Nonstress Test Second Interpretation: Reactive          FHR Category: Category I             Additional Comments Comments: , 37.2 weeks, sent in from office for elevated BP. Eran CANTRELL reviewed lab results and BPs,  discharge order received. Pt to continue taking BPs at home and follow up at appointment on Friday. Pt instructed on kick counts, labor precautions, and s/s of preeclampsia. Pt verbalizes understanding. Pt home ambulatory.     Chief Complaint   Patient presents with    Elevated BP     Pt sent from office for elevated BP. +FM.         Cate SAHNI RN  2025 5:18 PM    S: 26 yo  @ 37 2/7 wks sent over from office for r/o pre-e. Denies headaches, vision changes or epigastric pain. Has been checking b/p's at home and normal.   O:   129/74 -- -- -- --        25 1600 -- 71 -- 115/68 -- -- -- --    25 1545 -- 74 -- 132/82 -- -- -- --    25 1530 -- 66 -- 124/74 -- -- -- --    25 1515 -- 64 -- 119/74 -- -- -- --    25 1500 -- 76 -- 118/77 -- -- -- --    25 1445 -- 74 -- 144/77 -- -- -- --    25 1430 -- 79 -- 136/80          Component      Latest Ref Rng 2025   WBC      4.0 - 11.0 x10(3) uL 11.8 (H)    RBC      3.80 - 5.30 x10(6)uL 3.70 (L)    Hemoglobin      12.0 - 16.0 g/dL 9.8 (L)    Hematocrit      35.0 - 48.0 % 31.6 (L)    MCV      80.0 - 100.0 fL 85.4    MCH      26.0 - 34.0 pg 26.5    MCHC      31.0 - 37.0 g/dL 31.0    RDW-SD      35.1 - 46.3 fL 43.9    RDW      11.0 - 15.0 % 14.3    Platelet Count      150.0 - 450.0 10(3)uL 373.0    Prelim Neutrophil Abs      1.50 - 7.70 x10 (3) uL 9.01 (H)    Neutrophils Absolute      1.50 - 7.70 x10(3) uL 9.01 (H)    Lymphocytes Absolute      1.00 - 4.00 x10(3) uL 2.07    Monocytes Absolute      0.10 - 1.00 x10(3) uL 0.61    Eosinophils Absolute      0.00 - 0.70 x10(3) uL 0.03    Basophils Absolute      0.00 - 0.20 x10(3) uL 0.04    Immature Granulocyte Absolute      0.00 - 1.00 x10(3) uL 0.06    Neutrophils %      % 76.2    Lymphocytes %      % 17.5    Monocytes %      % 5.2    Eosinophils %      % 0.3    Basophils %      % 0.3    Immature Granulocyte %      % 0.5    Glucose      70 - 99 mg/dL 76    Sodium      136  - 145 mmol/L 135 (L)    Potassium      3.5 - 5.1 mmol/L 4.2    Chloride      98 - 112 mmol/L 105    Carbon Dioxide, Total      21.0 - 32.0 mmol/L 23.0    ANION GAP      0 - 18 mmol/L 7    BUN      9 - 23 mg/dL 8 (L)    CREATININE      0.55 - 1.02 mg/dL 0.72    BUN/CREATININE RATIO      10.0 - 20.0  11.1    CALCIUM      8.7 - 10.4 mg/dL 9.6    CALCULATED OSMOLALITY      275 - 295 mOsm/kg 277    EGFR      >=60 mL/min/1.73m2 120    ALT (SGPT)      10 - 49 U/L 19    AST (SGOT)      <34 U/L 24    ALKALINE PHOSPHATASE      52 - 144 U/L 268 (H)    Total Bilirubin      0.3 - 1.2 mg/dL 0.4    PROTEIN, TOTAL      5.7 - 8.2 g/dL 6.7    Albumin      3.2 - 4.8 g/dL 4.1    Globulin      2.0 - 3.5 g/dL 2.6    A/G Ratio      1.0 - 2.0  1.6    Patient Fasting for CMP? No    TOTAL PROTEIN URINE RANDOM      <14.0 mg/dL <6.0    CREATININE UR RANDOM      mg/dL 39.10    Urine Protein/Creatinine Ratio, Random --       P/C ratio  unable to calculate d/t protein < 6 mg/dl     bpm, moderate variability, + accels, no decels  [place]: no contractions   + pitting edema of feet/ankles  A/P: High blood pressure in pregnancy  Reactive NST  Likely Chronic HTN as has elevated b/p at 19 wks, 23 wks, 31 wks. Eval in triage today normal labs, 1 mildly elevated b/p. C/W Dr Nichole rec delivery at 38-39 wks. F/U visit on Friday. To bring in cuf to check against our machine & schedule IOL at visit. D/W Dr Griffin who agrees with plan.     My total time spent caring for the patient on the day of the encounter:  20 minutes, which included: chart review, exam, care coordination, charting, and counseling as per above.         [1] No Known Allergies

## 2025-07-11 LAB — GROUP B STREP BY PCR FOR PCR OVT: NEGATIVE

## 2025-07-14 ENCOUNTER — HOSPITAL ENCOUNTER (OUTPATIENT)
Facility: HOSPITAL | Age: 25
Discharge: HOME | End: 2025-07-14
Attending: ADVANCED PRACTICE MIDWIFE | Admitting: OBSTETRICS & GYNECOLOGY
Payer: COMMERCIAL

## 2025-07-14 ENCOUNTER — ROUTINE PRENATAL (OUTPATIENT)
Dept: OBGYN CLINIC | Facility: CLINIC | Age: 25
End: 2025-07-14
Payer: COMMERCIAL

## 2025-07-14 VITALS
RESPIRATION RATE: 17 BRPM | HEART RATE: 88 BPM | TEMPERATURE: 99 F | SYSTOLIC BLOOD PRESSURE: 141 MMHG | DIASTOLIC BLOOD PRESSURE: 89 MMHG

## 2025-07-14 VITALS
SYSTOLIC BLOOD PRESSURE: 145 MMHG | BODY MASS INDEX: 34 KG/M2 | WEIGHT: 198 LBS | HEART RATE: 81 BPM | DIASTOLIC BLOOD PRESSURE: 80 MMHG

## 2025-07-14 DIAGNOSIS — R03.0 ELEVATED BLOOD PRESSURE READING IN OFFICE WITHOUT DIAGNOSIS OF HYPERTENSION: ICD-10-CM

## 2025-07-14 DIAGNOSIS — Z34.93 PRENATAL CARE, THIRD TRIMESTER (HCC): Primary | ICD-10-CM

## 2025-07-14 PROBLEM — O14.93 PRE-ECLAMPSIA IN THIRD TRIMESTER (HCC): Status: ACTIVE | Noted: 2025-07-09

## 2025-07-14 LAB
ALBUMIN SERPL-MCNC: 4.1 G/DL (ref 3.2–4.8)
ALBUMIN/GLOB SERPL: 1.6 (ref 1–2)
ALP LIVER SERPL-CCNC: 284 U/L (ref 52–144)
ALT SERPL-CCNC: 19 U/L (ref 10–49)
ANION GAP SERPL CALC-SCNC: 11 MMOL/L (ref 0–18)
AST SERPL-CCNC: 27 U/L (ref ?–34)
BASOPHILS # BLD AUTO: 0.03 X10(3) UL (ref 0–0.2)
BASOPHILS NFR BLD AUTO: 0.3 %
BILIRUB SERPL-MCNC: 0.3 MG/DL (ref 0.3–1.2)
BUN BLD-MCNC: 9 MG/DL (ref 9–23)
BUN/CREAT SERPL: 12.3 (ref 10–20)
CALCIUM BLD-MCNC: 9.2 MG/DL (ref 8.7–10.4)
CHLORIDE SERPL-SCNC: 101 MMOL/L (ref 98–112)
CO2 SERPL-SCNC: 23 MMOL/L (ref 21–32)
CREAT BLD-MCNC: 0.73 MG/DL (ref 0.55–1.02)
CREAT UR-SCNC: 38.2 MG/DL
DEPRECATED RDW RBC AUTO: 45 FL (ref 35.1–46.3)
EGFRCR SERPLBLD CKD-EPI 2021: 118 ML/MIN/1.73M2 (ref 60–?)
EOSINOPHIL # BLD AUTO: 0.06 X10(3) UL (ref 0–0.7)
EOSINOPHIL NFR BLD AUTO: 0.5 %
ERYTHROCYTE [DISTWIDTH] IN BLOOD BY AUTOMATED COUNT: 14.6 % (ref 11–15)
FASTING STATUS PATIENT QL REPORTED: NO
GLOBULIN PLAS-MCNC: 2.5 G/DL (ref 2–3.5)
GLUCOSE BLD-MCNC: 82 MG/DL (ref 70–99)
HCT VFR BLD AUTO: 31.9 % (ref 35–48)
HGB BLD-MCNC: 9.8 G/DL (ref 12–16)
IMM GRANULOCYTES # BLD AUTO: 0.04 X10(3) UL (ref 0–1)
IMM GRANULOCYTES NFR BLD: 0.4 %
LYMPHOCYTES # BLD AUTO: 2.18 X10(3) UL (ref 1–4)
LYMPHOCYTES NFR BLD AUTO: 19.8 %
MCH RBC QN AUTO: 26.3 PG (ref 26–34)
MCHC RBC AUTO-ENTMCNC: 30.7 G/DL (ref 31–37)
MCV RBC AUTO: 85.5 FL (ref 80–100)
MONOCYTES # BLD AUTO: 0.56 X10(3) UL (ref 0.1–1)
MONOCYTES NFR BLD AUTO: 5.1 %
NEUTROPHILS # BLD AUTO: 8.13 X10 (3) UL (ref 1.5–7.7)
NEUTROPHILS # BLD AUTO: 8.13 X10(3) UL (ref 1.5–7.7)
NEUTROPHILS NFR BLD AUTO: 73.9 %
OSMOLALITY SERPL CALC.SUM OF ELEC: 278 MOSM/KG (ref 275–295)
PLATELET # BLD AUTO: 343 10(3)UL (ref 150–450)
POTASSIUM SERPL-SCNC: 3.8 MMOL/L (ref 3.5–5.1)
PROT SERPL-MCNC: 6.6 G/DL (ref 5.7–8.2)
PROT UR-MCNC: 15.3 MG/DL (ref ?–14)
PROT/CREAT UR-RTO: 0.4
RBC # BLD AUTO: 3.73 X10(6)UL (ref 3.8–5.3)
SODIUM SERPL-SCNC: 135 MMOL/L (ref 136–145)
WBC # BLD AUTO: 11 X10(3) UL (ref 4–11)

## 2025-07-14 PROCEDURE — 3079F DIAST BP 80-89 MM HG: CPT | Performed by: ADVANCED PRACTICE MIDWIFE

## 2025-07-14 PROCEDURE — 59025 FETAL NON-STRESS TEST: CPT | Performed by: ADVANCED PRACTICE MIDWIFE

## 2025-07-14 PROCEDURE — 3077F SYST BP >= 140 MM HG: CPT | Performed by: ADVANCED PRACTICE MIDWIFE

## 2025-07-14 NOTE — PROGRESS NOTES
Pt is a 25 year old female admitted to TR3/TR3-A.     Chief Complaint   Patient presents with    R/o Preeclampsia     Patient sent in from the office for elevated bps      Pt is  38w0d intra-uterine pregnancy.  History obtained, consents signed. Oriented to room, staff, and plan of care.

## 2025-07-14 NOTE — PROGRESS NOTES
Feeling well. Endorses regular fetal movement. Denies contractions, LOF, vaginal bleeding.     BP has been normal at home. Denies HA, vision changes, epigastric pain.    Plan:  Eval in triage    Reviewed third trimester warning signs and when to call.

## 2025-07-15 ENCOUNTER — HOSPITAL ENCOUNTER (INPATIENT)
Facility: HOSPITAL | Age: 25
LOS: 2 days | Discharge: HOME | End: 2025-07-17
Attending: ADVANCED PRACTICE MIDWIFE | Admitting: OBSTETRICS & GYNECOLOGY
Payer: COMMERCIAL

## 2025-07-15 ENCOUNTER — ANESTHESIA EVENT (OUTPATIENT)
Dept: OBGYN UNIT | Facility: HOSPITAL | Age: 25
End: 2025-07-15
Payer: COMMERCIAL

## 2025-07-15 ENCOUNTER — ANESTHESIA (OUTPATIENT)
Dept: OBGYN UNIT | Facility: HOSPITAL | Age: 25
End: 2025-07-15
Payer: COMMERCIAL

## 2025-07-15 ENCOUNTER — APPOINTMENT (OUTPATIENT)
Dept: OBGYN CLINIC | Facility: HOSPITAL | Age: 25
End: 2025-07-15
Attending: ADVANCED PRACTICE MIDWIFE
Payer: COMMERCIAL

## 2025-07-15 PROBLEM — Z34.90 PREGNANCY (HCC): Status: ACTIVE | Noted: 2025-07-15

## 2025-07-15 PROBLEM — Z34.90 ENCOUNTER FOR INDUCTION OF LABOR (HCC): Status: ACTIVE | Noted: 2025-07-15

## 2025-07-15 LAB
ALBUMIN SERPL-MCNC: 3.6 G/DL (ref 3.2–4.8)
ALBUMIN/GLOB SERPL: 1.5 (ref 1–2)
ALP LIVER SERPL-CCNC: 257 U/L (ref 52–144)
ALT SERPL-CCNC: 16 U/L (ref 10–49)
ANION GAP SERPL CALC-SCNC: 11 MMOL/L (ref 0–18)
ANTIBODY SCREEN: POSITIVE
AST SERPL-CCNC: 22 U/L (ref ?–34)
BASOPHILS # BLD AUTO: 0.02 X10(3) UL (ref 0–0.2)
BASOPHILS NFR BLD AUTO: 0.2 %
BILIRUB SERPL-MCNC: 0.4 MG/DL (ref 0.3–1.2)
BUN BLD-MCNC: 8 MG/DL (ref 9–23)
BUN/CREAT SERPL: 11.4 (ref 10–20)
CALCIUM BLD-MCNC: 8.8 MG/DL (ref 8.7–10.4)
CHLORIDE SERPL-SCNC: 105 MMOL/L (ref 98–112)
CO2 SERPL-SCNC: 20 MMOL/L (ref 21–32)
CREAT BLD-MCNC: 0.7 MG/DL (ref 0.55–1.02)
DEPRECATED RDW RBC AUTO: 44.3 FL (ref 35.1–46.3)
DIRECT COOMBS POLY: NEGATIVE
EGFRCR SERPLBLD CKD-EPI 2021: 125 ML/MIN/1.73M2 (ref 60–?)
EOSINOPHIL # BLD AUTO: 0.03 X10(3) UL (ref 0–0.7)
EOSINOPHIL NFR BLD AUTO: 0.3 %
ERYTHROCYTE [DISTWIDTH] IN BLOOD BY AUTOMATED COUNT: 14.6 % (ref 11–15)
GLOBULIN PLAS-MCNC: 2.4 G/DL (ref 2–3.5)
GLUCOSE BLD-MCNC: 108 MG/DL (ref 70–99)
HCT VFR BLD AUTO: 27.2 % (ref 35–48)
HGB BLD-MCNC: 8.5 G/DL (ref 12–16)
IMM GRANULOCYTES # BLD AUTO: 0.05 X10(3) UL (ref 0–1)
IMM GRANULOCYTES NFR BLD: 0.5 %
LYMPHOCYTES # BLD AUTO: 1.73 X10(3) UL (ref 1–4)
LYMPHOCYTES NFR BLD AUTO: 16.9 %
MCH RBC QN AUTO: 26.2 PG (ref 26–34)
MCHC RBC AUTO-ENTMCNC: 31.3 G/DL (ref 31–37)
MCV RBC AUTO: 83.7 FL (ref 80–100)
MONOCYTES # BLD AUTO: 0.62 X10(3) UL (ref 0.1–1)
MONOCYTES NFR BLD AUTO: 6.1 %
NEUTROPHILS # BLD AUTO: 7.78 X10 (3) UL (ref 1.5–7.7)
NEUTROPHILS # BLD AUTO: 7.78 X10(3) UL (ref 1.5–7.7)
NEUTROPHILS NFR BLD AUTO: 76 %
OSMOLALITY SERPL CALC.SUM OF ELEC: 281 MOSM/KG (ref 275–295)
PLATELET # BLD AUTO: 314 10(3)UL (ref 150–450)
POTASSIUM SERPL-SCNC: 3.7 MMOL/L (ref 3.5–5.1)
PROT SERPL-MCNC: 6 G/DL (ref 5.7–8.2)
RBC # BLD AUTO: 3.25 X10(6)UL (ref 3.8–5.3)
RH BLOOD TYPE: POSITIVE
SODIUM SERPL-SCNC: 136 MMOL/L (ref 136–145)
T PALLIDUM AB SER QL IA: NONREACTIVE
WBC # BLD AUTO: 10.2 X10(3) UL (ref 4–11)

## 2025-07-15 PROCEDURE — 10907ZC DRAINAGE OF AMNIOTIC FLUID, THERAPEUTIC FROM PRODUCTS OF CONCEPTION, VIA NATURAL OR ARTIFICIAL OPENING: ICD-10-PCS | Performed by: ADVANCED PRACTICE MIDWIFE

## 2025-07-15 PROCEDURE — 0U7C7DJ DILATION OF CERVIX WITH INTRALUMINAL DEVICE, TEMPORARY, VIA NATURAL OR ARTIFICIAL OPENING: ICD-10-PCS | Performed by: ADVANCED PRACTICE MIDWIFE

## 2025-07-15 PROCEDURE — 0KQM0ZZ REPAIR PERINEUM MUSCLE, OPEN APPROACH: ICD-10-PCS | Performed by: ADVANCED PRACTICE MIDWIFE

## 2025-07-15 PROCEDURE — 3E033VJ INTRODUCTION OF OTHER HORMONE INTO PERIPHERAL VEIN, PERCUTANEOUS APPROACH: ICD-10-PCS | Performed by: ADVANCED PRACTICE MIDWIFE

## 2025-07-15 RX ORDER — TERBUTALINE SULFATE 1 MG/ML
0.25 INJECTION SUBCUTANEOUS AS NEEDED
Status: DISCONTINUED | OUTPATIENT
Start: 2025-07-15 | End: 2025-07-16 | Stop reason: HOSPADM

## 2025-07-15 RX ORDER — LIDOCAINE HYDROCHLORIDE 10 MG/ML
INJECTION, SOLUTION INFILTRATION; PERINEURAL
Status: COMPLETED | OUTPATIENT
Start: 2025-07-15 | End: 2025-07-15

## 2025-07-15 RX ORDER — ONDANSETRON 2 MG/ML
4 INJECTION INTRAMUSCULAR; INTRAVENOUS EVERY 6 HOURS PRN
Status: DISCONTINUED | OUTPATIENT
Start: 2025-07-15 | End: 2025-07-16 | Stop reason: HOSPADM

## 2025-07-15 RX ORDER — LIDOCAINE HYDROCHLORIDE 10 MG/ML
30 INJECTION, SOLUTION EPIDURAL; INFILTRATION; INTRACAUDAL; PERINEURAL ONCE
Status: DISCONTINUED | OUTPATIENT
Start: 2025-07-15 | End: 2025-07-16 | Stop reason: HOSPADM

## 2025-07-15 RX ORDER — BUPIVACAINE HCL/0.9 % NACL/PF 0.25 %
5 PLASTIC BAG, INJECTION (ML) EPIDURAL AS NEEDED
Status: DISCONTINUED | OUTPATIENT
Start: 2025-07-15 | End: 2025-07-17

## 2025-07-15 RX ORDER — ACETAMINOPHEN 500 MG
1000 TABLET ORAL EVERY 6 HOURS PRN
Status: DISCONTINUED | OUTPATIENT
Start: 2025-07-15 | End: 2025-07-16

## 2025-07-15 RX ORDER — CALCIUM CARBONATE 500 MG/1
1000 TABLET, CHEWABLE ORAL EVERY 4 HOURS PRN
Status: DISCONTINUED | OUTPATIENT
Start: 2025-07-15 | End: 2025-07-16 | Stop reason: HOSPADM

## 2025-07-15 RX ORDER — SODIUM CHLORIDE, SODIUM LACTATE, POTASSIUM CHLORIDE, CALCIUM CHLORIDE 600; 310; 30; 20 MG/100ML; MG/100ML; MG/100ML; MG/100ML
INJECTION, SOLUTION INTRAVENOUS CONTINUOUS
Status: DISCONTINUED | OUTPATIENT
Start: 2025-07-15 | End: 2025-07-16 | Stop reason: HOSPADM

## 2025-07-15 RX ORDER — NALBUPHINE HYDROCHLORIDE 10 MG/ML
2.5 INJECTION INTRAMUSCULAR; INTRAVENOUS; SUBCUTANEOUS
Status: DISCONTINUED | OUTPATIENT
Start: 2025-07-15 | End: 2025-07-17

## 2025-07-15 RX ORDER — CITRIC ACID/SODIUM CITRATE 334-500MG
30 SOLUTION, ORAL ORAL AS NEEDED
Status: DISCONTINUED | OUTPATIENT
Start: 2025-07-15 | End: 2025-07-16 | Stop reason: HOSPADM

## 2025-07-15 RX ORDER — ACETAMINOPHEN 500 MG
500 TABLET ORAL EVERY 6 HOURS PRN
Status: DISCONTINUED | OUTPATIENT
Start: 2025-07-15 | End: 2025-07-16

## 2025-07-15 RX ORDER — DEXTROSE, SODIUM CHLORIDE, SODIUM LACTATE, POTASSIUM CHLORIDE, AND CALCIUM CHLORIDE 5; .6; .31; .03; .02 G/100ML; G/100ML; G/100ML; G/100ML; G/100ML
INJECTION, SOLUTION INTRAVENOUS AS NEEDED
Status: DISCONTINUED | OUTPATIENT
Start: 2025-07-15 | End: 2025-07-16 | Stop reason: HOSPADM

## 2025-07-15 RX ORDER — BUPIVACAINE HYDROCHLORIDE 2.5 MG/ML
20 INJECTION, SOLUTION EPIDURAL; INFILTRATION; INTRACAUDAL; PERINEURAL ONCE
Status: COMPLETED | OUTPATIENT
Start: 2025-07-15 | End: 2025-07-15

## 2025-07-15 RX ORDER — LIDOCAINE HYDROCHLORIDE AND EPINEPHRINE 15; 5 MG/ML; UG/ML
INJECTION, SOLUTION EPIDURAL AS NEEDED
Status: DISCONTINUED | OUTPATIENT
Start: 2025-07-15 | End: 2025-07-15 | Stop reason: SURG

## 2025-07-15 RX ORDER — METOCLOPRAMIDE HYDROCHLORIDE 5 MG/ML
10 INJECTION INTRAMUSCULAR; INTRAVENOUS EVERY 6 HOURS PRN
Status: DISCONTINUED | OUTPATIENT
Start: 2025-07-15 | End: 2025-07-16 | Stop reason: HOSPADM

## 2025-07-15 RX ORDER — IBUPROFEN 600 MG/1
600 TABLET, FILM COATED ORAL ONCE AS NEEDED
Status: DISCONTINUED | OUTPATIENT
Start: 2025-07-15 | End: 2025-07-16 | Stop reason: HOSPADM

## 2025-07-15 RX ADMIN — LIDOCAINE HYDROCHLORIDE AND EPINEPHRINE 3 ML: 15; 5 INJECTION, SOLUTION EPIDURAL at 16:00:00

## 2025-07-15 RX ADMIN — BUPIVACAINE HYDROCHLORIDE 3 ML: 2.5 INJECTION, SOLUTION EPIDURAL; INFILTRATION; INTRACAUDAL; PERINEURAL at 16:02:00

## 2025-07-15 RX ADMIN — LIDOCAINE HYDROCHLORIDE 3 ML: 10 INJECTION, SOLUTION INFILTRATION; PERINEURAL at 15:56:00

## 2025-07-15 NOTE — TRIAGE
[facility]  part of [facility]      Triage Note    Melissa Desai Patient Status:  Outpatient    3/27/2000 MRN [id number]   Location [facility] Attending Delores Kirk, ÓSCAR   Hosp Day # 0 PCP Cynthia Latham MD      Para:   Estimated Date of Delivery: 25  Gestation: 38w0d    Chief Complaint    R/o Preeclampsia         Allergies:  Allergies[1]    Orders Placed This Encounter   Procedures    Comp Metabolic Panel (14)    CBC With Differential With Platelet    Protein/Creatinine Ratio, Urine Random       Lab Results   Component Value Date    WBC 11.0 2025    HGB 9.8 (L) 2025    HCT 31.9 (L) 2025    .0 2025    CREATSERUM 0.73 2025    BUN 9 2025     (L) 2025    K 3.8 2025     2025    CO2 23.0 2025    GLU 82 2025    CA 9.2 2025    ALB 4.1 2025    ALKPHO 284 (H) 2025    BILT 0.3 2025    TP 6.6 2025    AST 27 2025    ALT 19 2025       Clinitek UA  Lab Results   Component Value Date    SPECGRAVITY 1.030 2019    URINECUL No Growth at 18-24 hrs. 2025       UA  Lab Results   Component Value Date    SPECGRAVITY 1.030 2019    NITRITE Positive 2019       Vitals:    25 1900 25 1915 25 1930   BP: 136/84 136/81 128/86   BP Location: Left arm Left arm    Pulse: 81 86 95   Resp: 17     Temp: 98.7 °F (37.1 °C)     TempSrc: Oral         NST                                                                                                                                                         Additional Comments       Chief Complaint   Patient presents with    R/o Preeclampsia     Patient sent in from the office for elevated bps         Tomasa Rubio CNM  2025 8:31 PM    S: Pt was sent from office with mildly elevated BP x1. This is a recurrent condition and she has been evaluated in Triage before  with normal pre-eclampsia labs. There is a note in her chart that on  she was again evaluated in Triage with a telephone consult to Dr Nichole who recommended delivery between 38-39wga due to likely undiagnosed chronic hypertension. However, she again had an elevated BP in office and P/C ratio in Triage was 0.40.  Pt denies headache, vision changes, or epigastric pain.   Denies contractions or other signs of labor.    O: Most BP's normal here in Triage. However, her latest pressure is mildly elevated, timed with tearfulness and stress related to recommendation to stay for delivery tonight. Other vitals stable  FHTs 135 with moderate variability, accelerations present, decelerations absent  Contractions: about q2-4 minutes with irritability  SVE 1/70/-2, soft, cephalic  P/C ratio 0.40 as noted above  Other pre-eclampsia labs WNL    A/P: Melissa is 25 year old, , with current EGA of 38w0d  New diagnosis of superimposed pre-eclampsia without severe features  Recommended that pt stay for induction of labor tonight and reviewed ripening process/options  Pt does not consent to staying tonight because her fiance is in night school more than an hour away. She is tearful. Agrees to return at 0800 in the morning for induction. Agrees to page immediately if signs/symptoms of pre-eclampsia  Pt discharged home with precautions    She voiced understanding and agreed with plan. All questions answered.                  [1] No Known Allergies

## 2025-07-15 NOTE — PROGRESS NOTES
[facility]  part of [facility]    Labor Progress Note    Melsisa Desai Patient Status:  Inpatient    3/27/2000 MRN [id number]   Location [facility] Attending Sobia Payton CNM   Hosp Day # 0 PCP Cynthia Latham MD     Subjective:   Interval History:   Melissa is comfortable with epidural. FOB, mom and sister present and supportive    Objective:   Vital signs in last 24 hours:  Temp:  [98.7 °F (37.1 °C)] 98.7 °F (37.1 °C)  Pulse:  [] 81  Resp:  [17-18] 18  BP: (113-145)/(62-89) 135/73  SpO2:  [99 %-100 %] 100 %    Input/Output:    Intake/Output Summary (Last 24 hours) at 7/15/2025 1844  Last data filed at 7/15/2025 0940  Gross per 24 hour   Intake --   Output 140 ml   Net -140 ml       Fetal Surveillance:  Fetal heart tones: 130 baseline/moderate variability/accels present/no decels  Uterine contractions: 2-4 minutes apart    Sterile vaginal exam:  5.5cm/50%/-2  AROM, clear fluid at 1725    Results:   Lab Results   Component Value Date    TREPONEMALAB Nonreactive 07/15/2025    ABO A 07/15/2025    RH Positive 07/15/2025    WBC 10.2 07/15/2025    HGB 8.5 (L) 07/15/2025    HCT 27.2 (L) 07/15/2025    .0 07/15/2025    CREATSERUM 0.70 07/15/2025    BUN 8 (L) 07/15/2025     07/15/2025    K 3.7 07/15/2025     07/15/2025    CO2 20.0 (L) 07/15/2025     (H) 07/15/2025    CA 8.8 07/15/2025    ALB 3.6 07/15/2025    ALKPHO 257 (H) 07/15/2025    BILT 0.4 07/15/2025    TP 6.0 07/15/2025    AST 22 07/15/2025    ALT 16 07/15/2025       Lab Results   Component Value Date    SPECGRAVITY 1.030 2019    NITRITE Positive 2019       Assessment & Plan:     Pregnant ([facility])          Anemia, antepartum ([facility])  Admit hgb 8.5       Excess weight gain in pregnancy, third trimester ([facility])  Pre-pregnancy BMI 22  68# weight gain       Pre-diabetes  Hgb A1C 5.7  Normal 3hr GTT       Pre-eclampsia in third trimester ([facility])  Asymptomatic  BP WNL to mildly  elevated  Dr. Wadsworth aware of admit and consult ordered       Encounter for induction of labor (HCC)      Melissa is a 24yo  at 38.1wks admitted for IOL due to pre-eclampsia. Pregnancy history significant for anemia, excessive weight gain, pre-diabetes, and pre-eclampsia. GBS negative.   Cat 1 FHTs   S/p cooks catheter   Pitocin at 8mu. Continue titrating as indicated   AROM, clear fluid   Epidural infusing   Continuous monitoring   Anticipate vaginal delivery      Plan discussed with patient who verbalizes understanding and agreement.    Sobia Payton CNM  7/15/2025

## 2025-07-15 NOTE — DISCHARGE INSTRUCTIONS
Discharge Instructions    Diet: Regular  Activity: Normal activity  Restrictions: No exercising;No lifting      General Instructions    Call your OB doctor if: Fluid leaking from your vagina;Vaginal or rectal pressure;Decrease in fetal movement;Uterine contractions 10 minutes or closer for 1 to 2 hours;Vaginal bleeding;Temperature greater than 100F

## 2025-07-15 NOTE — ANESTHESIA PROCEDURE NOTES
Labor Analgesia    Date/Time: 7/15/2025 3:56 PM    Performed by: Jaylon Lott DO  Authorized by: Calvin Robledo MD      General Information and Staff    Start Time:  7/15/2025 3:56 PM  End Time:  7/15/2025 4:00 PM  Anesthesiologist:  Jaylon Lott DO  Performed by:  Anesthesiologist  Patient Location:  OB  Reason for Block: labor epidural    Preanesthetic Checklist: patient identified, IV checked, site marked, risks and benefits discussed, monitors and equipment checked, pre-op evaluation, timeout performed, anesthesia consent and sterile technique used      Procedure Details    Patient Position:  Sitting  Prep: ChloraPrep    Monitoring:  Heart rate  Approach:  Midline    Epidural Needle    Injection Technique:  CRISELDA saline  Needle Type:  Tuohy  Needle Gauge:  18 G  Needle Length:  3.5 in  Needle Insertion Depth:  6  Location:  L3-4    Spinal Needle      Catheter    Catheter Type:  Multi-orifice  Catheter Size:  20 G  Catheter at Skin Depth:  12  Test Dose:  Negative    Assessment      Additional Comments

## 2025-07-15 NOTE — ANESTHESIA PREPROCEDURE EVALUATION
Anesthesia PreOp Note    HPI:     Melissa Desai is a 25 year old female who presents for preoperative consultation requested by: * No surgeons listed *    Date of Surgery: 7/15/2025    * No procedures listed *  Indication: * No pre-op diagnosis entered *    Relevant Problems   No relevant active problems       NPO:                         History Review:  Patient Active Problem List    Diagnosis Date Noted    Encounter for induction of labor ([facility]) 07/15/2025    Pre-eclampsia in third trimester ([facility]) 07/09/2025    Pre-diabetes 06/11/2025    Excess weight gain in pregnancy, third trimester ([facility]) 05/29/2025    Anemia, antepartum ([facility]) 05/09/2025    Elevated blood pressure reading in office without diagnosis of hypertension 04/01/2025    Pregnant ([facility]) 02/06/2025       Past Medical History[1]    Past Surgical History[2]    Prescriptions Prior to Admission[3]  Current Medications and Prescriptions Ordered in Epic[4]    Allergies[5]    Family History[6]  Social Hx on file[7]    Available pre-op labs reviewed.  Lab Results   Component Value Date    WBC 10.2 07/15/2025    RBC 3.25 (L) 07/15/2025    HGB 8.5 (L) 07/15/2025    HCT 27.2 (L) 07/15/2025    MCV 83.7 07/15/2025    MCH 26.2 07/15/2025    MCHC 31.3 07/15/2025    RDW 14.6 07/15/2025    .0 07/15/2025     Lab Results   Component Value Date     07/15/2025    K 3.7 07/15/2025     07/15/2025    CO2 20.0 (L) 07/15/2025    BUN 8 (L) 07/15/2025    CREATSERUM 0.70 07/15/2025     (H) 07/15/2025    CA 8.8 07/15/2025          Vital Signs:  Body mass index is 33.99 kg/m².   height is 1.626 m (5' 4\") and weight is 89.8 kg (198 lb). Her oral temperature is 98.7 °F (37.1 °C). Her blood pressure is 130/82 and her pulse is 71. Her respiration is 18.   Vitals:    07/15/25 0830 07/15/25 0915 07/15/25 1200   BP: 135/81  130/82   Pulse: 114  71   Resp: 18  18   Temp: 98.7 °F (37.1 °C)  98.7 °F (37.1 °C)   TempSrc: Oral  Oral   Weight:  89.8 kg (198 lb)     Height:  1.626 m (5' 4\")         Anesthesia Evaluation      Airway   Mallampati: II  TM distance: >3 FB  Neck ROM: full  Dental      Pulmonary - normal exam   Cardiovascular - normal exam  (+) hypertension    ROS comment: Pre-E    Neuro/Psych      GI/Hepatic/Renal      Endo/Other    Abdominal   (-) obese                 Anesthesia Plan:   ASA:  2  Plan:   Epidural  Plan Comments: 24 yo F requesting a labor epidural. She denies history of coagulopathy, not on blood thinners, no previous back surgery, spina bifida or scoliosis. Platelets are 314.     I have informed patient of the risks of neuraxial anesthesia including, but not limited to: failure, headache, backache, hematoma, unilateral/patchy block, difficulty breathing, infection, bleeding, nerve damage, paralysis, death. The patient desires the proposed neuraxial anesthetic as planned.     All anesthetic questions answered.    Informed Consent Plan and Risks Discussed With:  Patient      I have informed Melissa Hailequhart and/or legal guardian or family member of the nature of the anesthetic plan, benefits, risks including possible dental damage if relevant, major complications, and any alternative forms of anesthetic management.   All of the patient's questions were answered to the best of my ability. The patient desires the anesthetic management as planned.  Jaylon Lott DO  7/15/2025 3:52 PM  Present on Admission:   Pregnant (HCC)   Encounter for induction of labor (HCC)   Pre-eclampsia in third trimester (HCC)   Anemia, antepartum (HCC)   Excess weight gain in pregnancy, third trimester (HCC)   Pre-diabetes           [1]   Past Medical History:   Hemangioma of skin    right arm   [2] No past surgical history on file.  [3]   Medications Prior to Admission   Medication Sig Dispense Refill Last Dose/Taking    Ferrous Sulfate 325 (65 Fe) MG Oral Tab Take 1 tablet (325 mg total) by mouth every other day. 45 tablet 1 Taking    prenatal vitamin with DHA  27-0.8-228 MG Oral Cap Take 1 capsule by mouth in the morning.   Taking    Blood Pressure Monitoring Does not apply Kit Check BP once daily and keep a log to bring to your next visit. Call with  or greater for top number or 90 or greater for bottom number. 1 kit 0    [4]   Current Facility-Administered Medications Ordered in Epic   Medication Dose Route Frequency Provider Last Rate Last Admin    acetaminophen (Tylenol Extra Strength) tab 500 mg  500 mg Oral Q6H PRN Sobia Payton CNM        acetaminophen (Tylenol Extra Strength) tab 1,000 mg  1,000 mg Oral Q6H PRN Sobia Payton CNM        ibuprofen (Motrin) tab 600 mg  600 mg Oral Once PRN Sobia Payton CNM        ondansetron (Zofran) 4 MG/2ML injection 4 mg  4 mg Intravenous Q6H PRN Sobia Payton CNM        oxyTOCIN in sodium chloride 0.9% (Pitocin) 30 Units/500mL infusion premix  62.5-900 sergio-units/min Intravenous Continuous Sobia Payton CNM        terbutaline (Brethine) 1 MG/ML injection 0.25 mg  0.25 mg Subcutaneous PRN Sobia Payton CNM        sodium citrate-citric acid (Bicitra) 500-334 MG/5ML oral solution 30 mL  30 mL Oral PRN Sobia Payton CNM        lidocaine PF (Xylocaine-MPF) 1% injection  30 mL Intradermal Once Sobai Payton CNM        lactated ringers infusion   Intravenous Continuous Sobia Payton  mL/hr at 07/15/25 1115 New Bag at 07/15/25 1115    dextrose in lactated ringers 5% infusion   Intravenous PRN Sobia Payton CNM        lactated ringers IV bolus 500 mL  500 mL Intravenous PRN Sobia Payton CNM        calcium carbonate (Tums) chewable tab 1,000 mg  1,000 mg Oral Q4H PRN Sobia Payton CNM        metoclopramide (Reglan) 5 mg/mL injection 10 mg  10 mg Intravenous Q6H PRN Sobia Payton CNM        oxyTOCIN in sodium chloride 0.9% (Pitocin) 30 Units/500mL infusion premix  0.5-10 sergio-units/min Intravenous Continuous Sobia Payton CNM 4 mL/hr at 07/15/25 1254 4 sergio-units/min at 07/15/25 1254    fentaNYL-bupivacaine 2 mcg/mL-0.125%  in sodium chloride 0.9% 100 mL EPIDURAL infusion premix   Epidural Continuous Calvin Robledo MD        fentaNYL (Sublimaze) 50 mcg/mL injection 100 mcg  100 mcg Epidural Once Calvin Robledo MD        bupivacaine PF (Marcaine) 0.25% injection  20 mL Epidural Once Calvin Robledo MD        ePHEDrine (PF) 25 MG/5 ML injection 5 mg  5 mg Intravenous PRN Calvin Robledo MD        nalbuphine (Nubain) 10 mg/mL injection 2.5 mg  2.5 mg Intravenous Q15 Min PRN Calvin Robledo MD         No current [facility]-ordered outpatient medications on file.   [5] No Known Allergies  [6]   Family History  Problem Relation Age of Onset    Diabetes Paternal Grandfather     Cancer Paternal Grandfather     Hypertension Paternal Grandfather    [7]   Social History  Socioeconomic History    Marital status: Single   Tobacco Use    Smoking status: Never    Smokeless tobacco: Never   Vaping Use    Vaping status: Never Used   Substance and Sexual Activity    Alcohol use: No    Drug use: No

## 2025-07-15 NOTE — PROGRESS NOTES
[facility]  part of [facility]    Labor Progress Note    Melissa Desai Patient Status:  Inpatient    3/27/2000 MRN [id number]   Location [facility] Attending Sobia Payton CNM   Hosp Day # 0 PCP Cynthia Latham MD     Subjective:   Interval History:   Melissa reports feeling some cramping with contractions but is overall comfortable.    Objective:   Vital signs in last 24 hours:  Temp:  [98.7 °F (37.1 °C)] 98.7 °F (37.1 °C)  Pulse:  [] 71  Resp:  [17-18] 18  BP: (128-145)/(80-89) 130/82    Input/Output:    Intake/Output Summary (Last 24 hours) at 7/15/2025 1500  Last data filed at 7/15/2025 0940  Gross per 24 hour   Intake --   Output 140 ml   Net -140 ml       Fetal Surveillance:  Fetal heart tones: 135 baseline/moderate variability/accels present/variable decels x 2, now resolved  Uterine contractions: 2.5 minutes apart    Sterile vaginal exam:  5.5cm/50%/-2 per RN    Results:   Lab Results   Component Value Date    TREPONEMALAB Nonreactive 07/15/2025    ABO A 07/15/2025    RH Positive 07/15/2025    WBC 10.2 07/15/2025    HGB 8.5 (L) 07/15/2025    HCT 27.2 (L) 07/15/2025    .0 07/15/2025    CREATSERUM 0.70 07/15/2025    BUN 8 (L) 07/15/2025     07/15/2025    K 3.7 07/15/2025     07/15/2025    CO2 20.0 (L) 07/15/2025     (H) 07/15/2025    CA 8.8 07/15/2025    ALB 3.6 07/15/2025    ALKPHO 257 (H) 07/15/2025    BILT 0.4 07/15/2025    TP 6.0 07/15/2025    AST 22 07/15/2025    ALT 16 07/15/2025       Lab Results   Component Value Date    SPECGRAVITY 1.030 2019    NITRITE Positive 2019       Assessment & Plan:     Pregnant (HCC)          Anemia, antepartum ([facility])  Admit hgb 8.5       Excess weight gain in pregnancy, third trimester ([facility])  Pre-pregnancy BMI 22  68# weight gain       Pre-diabetes  Hgb A1C 5.7  Normal 3hr GTT       Pre-eclampsia in third trimester ([facility])  Asymptomatic  BP WNL to mildly elevated  Dr. Wadsworth  aware of admit and consult ordered       Encounter for induction of labor (HCC)      Melissa is a 26yo  at 38.1wks admitted for IOL due to pre-eclampsia. Pregnancy history significant for anemia, excessive weight gain, pre-diabetes, and pre-eclampsia. GBS negative.   Cat 2 FHTs   S/p cooks catheter   Pitocin at 4mu. Continue titrating as indicated   Continuous monitoring   Anticipate vaginal delivery      Plan discussed with patient who verbalizes understanding and agreement.    Sobia Payton CNM  7/15/2025

## 2025-07-15 NOTE — H&P
[facility]  part of [facility]    History & Physical    Melissa Desai Patient Status:  Inpatient    3/27/2000 MRN [id number]   Location [facility] Attending Sobia Payton CNM   Hosp Day # 0 Admitting Kiara Wadsworth DO     Date of Admission:  7/15/2025      HPI:   Melissa Desai is a 25 year old  current EGA of 38w1d with an estimated date of delivery of: 2025, by Last Menstrual Period    Melissa is being admitted for induction of labor.    Her current obstetrical history is significant for pre-eclampsia, excessive weight gain, anemia.    Patient reports good fetal movement, no contractions, and no leaking .  Denies HA, vision changes, epigastric pain.  Fetal Movement: normal.     History   Obstetric History:   OB History    Para Term  AB Living   2    1    SAB IAB Ectopic Multiple Live Births    1         # Outcome Date GA Lbr Luis/2nd Weight Sex Type Anes PTL Lv   2 Current            1 IAB              Past Medical History: Past Medical History[1]  Past Social History: Past Surgical History[2]  Family History: Family History[3]  Social History:   Social History     Tobacco Use    Smoking status: Never    Smokeless tobacco: Never   Substance Use Topics    Alcohol use: No        Allergies/Medications:   Allergies:   Allergies[4]  Medications:  Prescriptions Prior to Admission[5]    Review of Systems:   Constitutional: denies fever, aches, chills  HEENT: denies visual changes  Skin: denies any unusual skin lesions or itching  Lungs: denies shortness of breath  Cardiovascular: denies chest pain  GI: denies abdominal pain,denies heartburn, denies constipation or diarrhea  : denies dysuria, pelvic pain, vaginal discharge or bleeding  Musculoskeletal: denies back or joint pain  Neuro denies headaches or dizziness  Psych: denies depression or anxiety    Physical Exam:   Temp:  [98.7 °F (37.1 °C)] 98.7 °F (37.1 °C)  Pulse:   [] 114  Resp:  [17-18] 18  BP: (128-145)/(80-89) 135/81    Constitutional: alert, appears stated age, and cooperative comfortable   Skin: no lesions or erythema  Respiratory: clear, unlabored  Cardiac: RRR  Abdomen: soft, non-tender, EFW 7.5#, presentation cephalic, uterine contractions irregular  Fetal Surveillance:  140 BPM; Fetal heart variability: moderate  Fetal Heart Rate decelerations: none  Fetal Heart Rate accelerations: yes  Baseline FHR: 140 per minute  Uterine contractions: irregular  Pelvis: Gynecoid  External Genitalia:  No lesions  Sterile vaginal exam: Dilation: 1 cm dilation  Effacement: 40%  Station: -3  Position: Cephalic  Extremities: extremities normal, atraumatic, no cyanosis or edema  Musculoskeletal: steady gait  Reflexes: +1/+1  Psych:  Appropriate affect and speech    Results:     Prenatal Results       Initial       Test Value Reference Range Date Time    Blood Type (ABO Group)  A   07/15/25 0931    Rh Factor  Positive   07/15/25 0931    Antibody Screen (Required questions in OE to answer)  Negative   03/01/25 1156    HCT  34.0 % 35.0 - 48.0 03/01/25 1156    HGB  11.4 g/dL 12.0 - 16.0 03/01/25 1156    MCV  96.9 fL 80.0 - 100.0 03/01/25 1156    Platelets  343.0 10(3)uL 150.0 - 450.0 03/01/25 1156    Rubella  Positive  Positive 03/01/25 1156    TREP  Nonreactive  Nonreactive  03/01/25 1156    RPR ([facility])        Urine Culture  No Growth at 18-24 hrs.   03/01/25 1156    Hepatitis B  Nonreactive  Nonreactive  03/01/25 1156    HIV Combo  Non-Reactive  Non-Reactive 03/01/25 1156    HCV  Nonreactive  Nonreactive  03/01/25 1156              Optional Initial Labs       Test Value Reference Range Date Time    TSH        Pap Smear        HPV        GC DNA        Chlamydia DNA        GTT 1 Hr        Glucose Fasting        Glucose 1 Hr        Glucose 2 Hr        Glucose 3 Hr        HgB A1c  5.2 % <5.7 03/01/25 1156    Vitamin D                  8-20 Weeks       Test Value Reference Range Date Time     1st Trimester Aneuploidy Risk Assessment        Quad - Down Screen Risk Estimate - prior to 18        Quad - Down Maternal Age Risk - prior to 18        Quad - Trisomy 18 screen Risk Estimate - prior to 18        AFP Spina Bifida (Required questions in OE to answer )        QUAD/AFP - Interpretation        Free Fetal DNA         Genetic testing        Genetic testing        Genetic testing        GC DNA  Negative  Negative 25 1156    Chlamydia DNA  Negative  Negative 25 1156              24-28 Weeks       Test Value Reference Range Date Time    HCT  30.2 % 35.0 - 48.0 25 08    HGB  9.7 g/dL 12.0 - 16.0 25 08    Platelets  328.0 10(3)uL 150.0 - 450.0 25 08    GTT 1 Hr  131 mg/dL See comment 25 0824    Glucose Fasting  83 mg/dL See comment 25 0742    Glucose 1 Hr  139 mg/dL See comment 25 0852    Glucose 2 Hr  127 mg/dL See comment 25 0953    Glucose 3 Hr  74 mg/dL 70 - <140 25 1052    TSH         Profile        Urine Culture        GC DNA        Chlamydia DNA                  35-37 Weeks       Test Value Reference Range Date Time    HCT  27.2 % 35.0 - 48.0 07/15/25 0930       31.9 % 35.0 - 48.0 25 1857       31.6 % 35.0 - 48.0 25 1434    HGB  8.5 g/dL 12.0 - 16.0 07/15/25 0930       9.8 g/dL 12.0 - 16.0 25 1857       9.8 g/dL 12.0 - 16.0 25 1434    Platelets  314.0 10(3)uL 150.0 - 450.0 07/15/25 0930       343.0 10(3)uL 150.0 - 450.0 25 1857       373.0 10(3)uL 150.0 - 450.0 25 1434    TREP  Nonreactive  Nonreactive  25    RPR ([facility])        Genital Group B Culture  Negative  Negative 25 1416    TSH        Urine Culture        HIV Combo  Non-Reactive  Non-Reactive 05/08/25 0824    GC DNA        Chlamydia DNA        Rapid HIV                  Optional Labs       Test Value Reference Range Date Time    HgB A1c  5.7 % <5.7 25 0742    HGB Electrophoresis  (See  Report)    03/01/25 1156    Varicella Zoster  1.320  >1.000 03/01/25 1156    Cystic Fibrosis-Old         Cystic Fibrosis[32] (Required questions in OE to answer)        Cystic Fibrosis[165] (Required questions in OE to answer)        Cystic Fibrosis[165] (Required questions in OE to answer)        Cystic Fibrosis[165] (Required questions in OE to answer)        Sickle Cell        24Hr Urine Protein        24Hr Urine Creatinine        Parvo B19 IgM        Parvo B19 IgG                  Legend    ^: Historical                            Reviewed all prenatal ultrasounds    Admit labs pending    Assessment/Plan:    Melissa Desai is at an estimated gestational age of 38w1d with an estimated date of delivery of:  7/28/2025, by Last Menstrual Period    Not in labor.  Category 1 FHR tracing  Obstetrical history significant for pre-eclampsia, excessive weight gain, anemia.      Admission problem(s):    Pregnant ([facility])        Anemia, antepartum ([facility])  Admit hgb 8.5      Excess weight gain in pregnancy, third trimester ([facility])  Pre-pregnancy BMI 22  68# weight gain      Pre-diabetes  Hgb A1C 5.7  Normal 3hr GTT      Pre-eclampsia in third trimester ([facility])  Asymptomatic  BP WNL to mildly elevated  Dr. Wadsworth aware of admit and consult ordered      Encounter for induction of labor ([facility])  Discussed cooks catheter versus misoprostol for cervical ripening. Patient amenable to cooks.  Cooks placed with 80mL sterile water in uterine balloon and 60mL sterile water in vaginal  Pitocin protocol 1  Continuous monitoring  Anticipate vaginal delivery      Risks, benefits, alternatives and possible complications have been discussed in detail with the patient.   Pre-admission, admission, and post admission procedures and expectations were discussed in detail.    All questions answered, all appropriate consents will be signed at the Hospital. Admission is planned for today.   Cooks catheter for cervical ripening.    Sobia Payton,  ÓSCAR  7/15/2025  11:07 AM        [1]   Past Medical History:   Hemangioma of skin    right arm   [2] No past surgical history on file.  [3]   Family History  Problem Relation Age of Onset    Diabetes Paternal Grandfather     Cancer Paternal Grandfather     Hypertension Paternal Grandfather    [4] No Known Allergies  [5]   Medications Prior to Admission   Medication Sig Dispense Refill Last Dose/Taking    Ferrous Sulfate 325 (65 Fe) MG Oral Tab Take 1 tablet (325 mg total) by mouth every other day. 45 tablet 1 Taking    prenatal vitamin with DHA 27-0.8-228 MG Oral Cap Take 1 capsule by mouth in the morning.   Taking    Blood Pressure Monitoring Does not apply Kit Check BP once daily and keep a log to bring to your next visit. Call with  or greater for top number or 90 or greater for bottom number. 1 kit 0

## 2025-07-15 NOTE — TRIAGE
[facility]  part of [facility]      Triage Note    Melissa Desai Patient Status:  Outpatient    3/27/2000 MRN [id number]   Location [facility] Attending Delores Kirk CNM   Hosp Day # 0 PCP Cynthia Latham MD      Para:   Estimated Date of Delivery: 25  Gestation: 38w0d    Chief Complaint    R/o Preeclampsia         Allergies:  Allergies[1]    Orders Placed This Encounter   Procedures    Comp Metabolic Panel (14)    CBC With Differential With Platelet    Protein/Creatinine Ratio, Urine Random       Lab Results   Component Value Date    WBC 11.0 2025    HGB 9.8 (L) 2025    HCT 31.9 (L) 2025    .0 2025    CREATSERUM 0.73 2025    BUN 9 2025     (L) 2025    K 3.8 2025     2025    CO2 23.0 2025    GLU 82 2025    CA 9.2 2025    ALB 4.1 2025    ALKPHO 284 (H) 2025    BILT 0.3 2025    TP 6.6 2025    AST 27 2025    ALT 19 2025       Clinitek UA  Lab Results   Component Value Date    SPECGRAVITY 1.030 2019    URINECUL No Growth at 18-24 hrs. 2025       UA  Lab Results   Component Value Date    SPECGRAVITY 1.030 2019    NITRITE Positive 2019       Vitals:    25 1930 25 1945 25   BP: 128/86 137/84 131/83 141/89   BP Location:       Pulse: 95 93 85 88   Resp:       Temp:       TempSrc:           NST  Variability: Moderate           Accelerations: Yes           Decelerations: None            Baseline: 145 BPM           Uterine Irritability: No           Contractions: Irregular                    Contraction Frequency: 2-9                   Acoustic Stimulator: No           Nonstress Test Interpretation: Reactive           Nonstress Test Second Interpretation: Reactive          FHR Category: Category I             Additional Comments Comments: Pt presented to unit for  R/o ANGI. RAEGAN Rubio CNM on unit and made aware of pt BP's and lab results; discussed admission for IOL with pt. Pt to return tomorrow morning at 0800 for IOL.  Preeclampsia s&s reviewed with pt along with labor precautions and when to call the midwives.  Pt stated understanding of all instructions.     Chief Complaint   Patient presents with    R/o Preeclampsia     Patient sent in from the office for elevated bps         Kimberly HICKEY RN  7/14/2025 9:01 PM    Physician Evaluation      NST Interpretation: Reactive    Disposition:   Discharged    Comments:    See other Triage Note by this provider.    Tomasa Rubio CNM           [1] No Known Allergies

## 2025-07-16 PROBLEM — O13.3 GESTATIONAL HYPERTENSION, THIRD TRIMESTER (HCC): Status: ACTIVE | Noted: 2025-07-16

## 2025-07-16 PROBLEM — Z34.90 ENCOUNTER FOR INDUCTION OF LABOR (HCC): Status: RESOLVED | Noted: 2025-07-15 | Resolved: 2025-07-16

## 2025-07-16 PROBLEM — Z34.90 PREGNANT (HCC): Status: RESOLVED | Noted: 2025-02-06 | Resolved: 2025-07-16

## 2025-07-16 PROBLEM — R03.0 ELEVATED BLOOD PRESSURE READING IN OFFICE WITHOUT DIAGNOSIS OF HYPERTENSION: Status: RESOLVED | Noted: 2025-04-01 | Resolved: 2025-07-16

## 2025-07-16 PROBLEM — O26.03 EXCESS WEIGHT GAIN IN PREGNANCY, THIRD TRIMESTER (HCC): Status: RESOLVED | Noted: 2025-05-29 | Resolved: 2025-07-16

## 2025-07-16 PROBLEM — R73.03 PRE-DIABETES: Status: RESOLVED | Noted: 2025-06-11 | Resolved: 2025-07-16

## 2025-07-16 LAB
ALBUMIN SERPL-MCNC: 3.4 G/DL (ref 3.2–4.8)
ALBUMIN/GLOB SERPL: 1.6 (ref 1–2)
ALP LIVER SERPL-CCNC: 234 U/L (ref 52–144)
ALT SERPL-CCNC: 18 U/L (ref 10–49)
ANION GAP SERPL CALC-SCNC: 8 MMOL/L (ref 0–18)
AST SERPL-CCNC: 38 U/L (ref ?–34)
BILIRUB SERPL-MCNC: 0.5 MG/DL (ref 0.3–1.2)
BUN BLD-MCNC: 7 MG/DL (ref 9–23)
BUN/CREAT SERPL: 9.7 (ref 10–20)
CALCIUM BLD-MCNC: 8.6 MG/DL (ref 8.7–10.4)
CHLORIDE SERPL-SCNC: 108 MMOL/L (ref 98–112)
CO2 SERPL-SCNC: 23 MMOL/L (ref 21–32)
CREAT BLD-MCNC: 0.72 MG/DL (ref 0.55–1.02)
DEPRECATED RDW RBC AUTO: 45 FL (ref 35.1–46.3)
EGFRCR SERPLBLD CKD-EPI 2021: 120 ML/MIN/1.73M2 (ref 60–?)
ERYTHROCYTE [DISTWIDTH] IN BLOOD BY AUTOMATED COUNT: 14.8 % (ref 11–15)
GLOBULIN PLAS-MCNC: 2.1 G/DL (ref 2–3.5)
GLUCOSE BLD-MCNC: 114 MG/DL (ref 70–99)
HCT VFR BLD AUTO: 27.5 % (ref 35–48)
HGB BLD-MCNC: 8.7 G/DL (ref 12–16)
MCH RBC QN AUTO: 26.8 PG (ref 26–34)
MCHC RBC AUTO-ENTMCNC: 31.6 G/DL (ref 31–37)
MCV RBC AUTO: 84.6 FL (ref 80–100)
OSMOLALITY SERPL CALC.SUM OF ELEC: 287 MOSM/KG (ref 275–295)
PLATELET # BLD AUTO: 282 10(3)UL (ref 150–450)
POTASSIUM SERPL-SCNC: 3.7 MMOL/L (ref 3.5–5.1)
PROT SERPL-MCNC: 5.5 G/DL (ref 5.7–8.2)
RBC # BLD AUTO: 3.25 X10(6)UL (ref 3.8–5.3)
SODIUM SERPL-SCNC: 139 MMOL/L (ref 136–145)
WBC # BLD AUTO: 17.1 X10(3) UL (ref 4–11)

## 2025-07-16 PROCEDURE — 59400 OBSTETRICAL CARE: CPT | Performed by: ADVANCED PRACTICE MIDWIFE

## 2025-07-16 RX ORDER — ACETAMINOPHEN 500 MG
500 TABLET ORAL EVERY 6 HOURS PRN
Status: DISCONTINUED | OUTPATIENT
Start: 2025-07-16 | End: 2025-07-17

## 2025-07-16 RX ORDER — FERROUS SULFATE 325(65) MG
325 TABLET ORAL EVERY OTHER DAY
Status: CANCELLED | OUTPATIENT
Start: 2025-07-16

## 2025-07-16 RX ORDER — DOCUSATE SODIUM 100 MG/1
100 CAPSULE, LIQUID FILLED ORAL
Status: DISCONTINUED | OUTPATIENT
Start: 2025-07-16 | End: 2025-07-17

## 2025-07-16 RX ORDER — ACETAMINOPHEN 500 MG
1000 TABLET ORAL EVERY 6 HOURS PRN
Status: DISCONTINUED | OUTPATIENT
Start: 2025-07-16 | End: 2025-07-17

## 2025-07-16 RX ORDER — AMMONIA 15 % (W/V)
0.3 AMPUL (EA) INHALATION AS NEEDED
Status: DISCONTINUED | OUTPATIENT
Start: 2025-07-16 | End: 2025-07-17

## 2025-07-16 RX ORDER — IBUPROFEN 600 MG/1
600 TABLET, FILM COATED ORAL EVERY 6 HOURS
Status: DISCONTINUED | OUTPATIENT
Start: 2025-07-16 | End: 2025-07-17

## 2025-07-16 RX ORDER — BISACODYL 10 MG
10 SUPPOSITORY, RECTAL RECTAL ONCE AS NEEDED
Status: DISCONTINUED | OUTPATIENT
Start: 2025-07-16 | End: 2025-07-17

## 2025-07-16 RX ORDER — SIMETHICONE 80 MG
80 TABLET,CHEWABLE ORAL 3 TIMES DAILY PRN
Status: DISCONTINUED | OUTPATIENT
Start: 2025-07-16 | End: 2025-07-17

## 2025-07-16 NOTE — LACTATION NOTE
07/16/25 1435   Evaluation Type   Evaluation Type Inpatient   Problems identified   Problems identified Knowledge deficit   Maternal history   Maternal history PIH   Breastfeeding goal   Breastfeeding goal To maintain breast milk feeding per patient goal   Maternal Assessment   Bilateral Breasts Soft;Symmetrical   Bilateral Nipples Colostrum easily expressed   Prior breastfeeding experience (comment below) Primip   Breastfeeding Assistance Breastfeeding assistance provided with permission;Hand expression provided with permission;Breast exam provided with permission   Pain assessment   Pain, additional Pinching   Treatment of Sore Nipples Deeper latch techniques;Expressed breast milk;Lanolin   Guidelines for use of:   Breast pump type Hand Pump   Suggested use of pump Pump if infant is not latching to breast;For comfort as needed   Other (comment) LC assistance offered. LC eduated on feeding cues, skin to skin, feeding frequency/duration, expected I&Os, and feeding pattern of an infant under 24 hours old. LC demonstrated hand expression and was able to express and spoonfeed infant, which then perked infant up. LC assisted with a latch on the left side in laid back position. Deep and sustained latch achieved with a nutritive feeding pattern and audible swallows. Some pinching noted but small position adjustments helped relieve the discomfort. all questions answered.

## 2025-07-16 NOTE — PROGRESS NOTES
Patient up to bathroom with assist x 2.  Unable to void at this time. Patient transferred to mother/baby room  per wheelchair in stable condition with baby and personal belongings.  Accompanied by significant other and staff.  Report given to Ainsley mother/baby NAYELY.

## 2025-07-16 NOTE — CONSULTS
[facility]  Obstetrics and Gynecology Consultation     Melissa Desai Patient Status:  Inpatient    3/27/2000 MRN [id number]   Location [facility] Attending Sobia Payton CNM   Hospital Day 0 PCP Cynthia Latham MD     Reason for Consultation: Preeclampsia      Subjective:     HPI: Melissa Desai is a 25 year old  female with new diagnosis of pre-eclampsia necessitating OB physician consultation. Pt denies any headaches, vision changes, chest pain, shortness of breath, RUQ pain, or increased swelling. The patient reports no complaints currently. - comfortable with epidural in place.    OB History:  OB History    Para Term  AB Living   2 0 0 0 1 0   SAB IAB Ectopic Multiple Live Births   0 1 0 0 0       Meds:  Medications Ordered Prior to Encounter[1]     All:  Allergies[2]    PMH:  Past Medical History[3]    PSH:  Past Surgical History[4]    SH:  Social History     Socioeconomic History    Marital status: Single     Spouse name: Not on file    Number of children: Not on file    Years of education: Not on file    Highest education level: Not on file   Occupational History    Not on file   Tobacco Use    Smoking status: Never    Smokeless tobacco: Never   Vaping Use    Vaping status: Never Used   Substance and Sexual Activity    Alcohol use: No    Drug use: No    Sexual activity: Not on file   Other Topics Concern    Not on file   Social History Narrative    Not on file     Social Drivers of Health     Food Insecurity: No Food Insecurity (7/15/2025)    NCSS - Food Insecurity     Worried About Running Out of Food in the Last Year: No     Ran Out of Food in the Last Year: No   Transportation Needs: No Transportation Needs (7/15/2025)    NCSS - Transportation     Lack of Transportation: No   Stress: No Stress Concern Present (7/15/2025)    Stress     Feeling of Stress : No   Housing Stability: Not At Risk (7/15/2025)    NCSS -  Housing/Utilities     Has Housing: Yes     Worried About Losing Housing: No     Unable to Get Utilities: No       FH:  Family History[5]    Review of Systems:  General: no complaints  Eyes: no complaints  Respiratory: no complaints  Cardiovascular: no complaints  GI: no complaints  : no complaintsSee HPI  Hematological/lymphatic: no complaints  Psychiatric: no complaints  Endocrine:no complaints  Neurological: no complaints  Immunological: no complaints  Musculoskeletal:no complaints      Objective:     Vitals:    07/15/25 1801 07/15/25 1806 07/15/25 1816 07/15/25 1848   BP: 115/70  135/73 128/57   BP Location:       Pulse: 74 79 81 82   Resp:       Temp:       TempSrc:       SpO2: 100% 100% 100%    Weight:       Height:           Exam:   GENERAL: well developed, well nourished, in no apparent distress, alert and orientated X 3  PSYCH: mood and affect stable   SKIN: no new rashes, no new lesions  LUNGS: respirations non-labored  CARDIOVASCULAR: no peripheral edema or varicosities, skin warm and dry  ABDOMEN: Soft, non distended; non tender, no masses  EXTREMITIES:  Nontender without edema    Labs:  Lab Results   Component Value Date    WBC 10.2 07/15/2025    HGB 8.5 07/15/2025    HCT 27.2 07/15/2025    .0 07/15/2025    CREATSERUM 0.70 07/15/2025    BUN 8 07/15/2025     07/15/2025    K 3.7 07/15/2025     07/15/2025    CO2 20.0 07/15/2025     07/15/2025    CA 8.8 07/15/2025    ALB 3.6 07/15/2025    ALKPHO 257 07/15/2025    BILT 0.4 07/15/2025    TP 6.0 07/15/2025    AST 22 07/15/2025    ALT 16 07/15/2025     P:C ratio () - 0.4       Assessment:      Melissa Desai is a 25 year old  female who was admitted on 7/15/2025 for induction of labor for pre-eclampsia. Request for OBGYN consultation was made.      Problem List[6]      Plan:     Preeclampsia   - Pt diagnosed with elevated BP and proteinuria   - No signs or symptoms of severe preeclampsia.   - Reviewed signs and  symptoms of severe preeclampsia and magnesium protocol if meeting criteria.  - Vitals per unit protocol.   - Bps mildly elevated.  - labor progressing well, managed per CNM    All of the findings and plan were discussed with the patient.  She notes understanding and agrees with the plan of care.  All questions were answered to the best of my ability at this time.    Kiara Wadsworth DO       [1]   No current facility-administered medications on file prior to encounter.     Current Outpatient Medications on File Prior to Encounter   Medication Sig Dispense Refill    Ferrous Sulfate 325 (65 Fe) MG Oral Tab Take 1 tablet (325 mg total) by mouth every other day. 45 tablet 1    prenatal vitamin with DHA 27-0.8-228 MG Oral Cap Take 1 capsule by mouth in the morning.      Blood Pressure Monitoring Does not apply Kit Check BP once daily and keep a log to bring to your next visit. Call with  or greater for top number or 90 or greater for bottom number. 1 kit 0   [2] No Known Allergies  [3]   Past Medical History:   Hemangioma of skin    right arm   [4] No past surgical history on file.  [5]   Family History  Problem Relation Age of Onset    Diabetes Paternal Grandfather     Cancer Paternal Grandfather     Hypertension Paternal Grandfather    [6]   Patient Active Problem List  Diagnosis    Pregnant ([facility]CC)    Elevated blood pressure reading in office without diagnosis of hypertension    Anemia, antepartum (HCC)    Excess weight gain in pregnancy, third trimester (HCC)    Pre-diabetes    Pre-eclampsia in third trimester (HCC)    Encounter for induction of labor (HCC)

## 2025-07-16 NOTE — PROGRESS NOTES
[facility]  part of [facility]    OB/GYNE Progress Note      Melissa Desai Patient Status:  Inpatient    3/27/2000 MRN [id number]   Location [facility] 1W Attending Sobia Payton CNM   Hosp Day # 1 PCP Cynthia Latham MD        Assessment/Plan     Anemia, antepartum (HCC)  -CBC ordered  -Venofer ordered      Pre-eclampsia in third trimester ([facility])  -Asymptomatic. B/P's WNL since delivery  -Borderline elevated AST, CBC ordered      Vaginal delivery ([facility])  -Stable PP Day 1, stable          Discussed with: {     nurse     patient and partner     Plan discussed with patient who verbalizes understanding and agreement.        Subjective   Feeling well. Bleeding decreasing. Working on breastfeeding    Review of Systems:      Gynecological: lochia  Gastrointestinal: denies  Genitourinary: denies  Constitutional: denies  Cardiovascular: denies  Respiratory: denies  Neurologic: denies  Psychiatric: denies        Objective   Vital signs in last 24 hours:  Temp:  [98.1 °F (36.7 °C)-98.6 °F (37 °C)] 98.4 °F (36.9 °C)  Pulse:  [70-99] 71  Resp:  [15-18] 16  BP: (103-145)/(34-87) 138/82  SpO2:  [99 %-100 %] 100 %    Input/Output:    Intake/Output Summary (Last 24 hours) at 2025 1416  Last data filed at 2025 0810  Gross per 24 hour   Intake --   Output 2175 ml   Net -2175 ml       Weight (Last 6):  Wt Readings from Last 6 Encounters:   07/15/25 198 lb (89.8 kg)   25 198 lb (89.8 kg)   25 195 lb (88.5 kg)   25 191 lb (86.6 kg)   25 187 lb (84.8 kg)   25 173 lb (78.5 kg)     Body mass index is 33.99 kg/m².     Exam:  Breasts: Nipples intact, some redness right side, + milky discharge  Fundus firm, non-tender, 1FB below umbilicus  Lochia: small rubra  Perineum: well approximated, no swelling, no hematoma  Calves: Non-tender, no edema, Neg Homans       Dietrich Catheter Information  Does patient have a dietrich catheter: no  Has the dietrich catheter been removed: Not  Applicable          Results:     Lab Results   Component Value Date    TREPONEMALAB Nonreactive 07/15/2025    ABO A 07/15/2025    RH Positive 07/15/2025    WBC 10.2 07/15/2025    HGB 8.5 (L) 07/15/2025    HCT 27.2 (L) 07/15/2025    .0 07/15/2025    CREATSERUM 0.72 07/16/2025    BUN 7 (L) 07/16/2025     07/16/2025    K 3.7 07/16/2025     07/16/2025    CO2 23.0 07/16/2025     (H) 07/16/2025    CA 8.6 (L) 07/16/2025    ALB 3.4 07/16/2025    ALKPHO 234 (H) 07/16/2025    BILT 0.5 07/16/2025    TP 5.5 (L) 07/16/2025    AST 38 (H) 07/16/2025    ALT 18 07/16/2025       Lab Results   Component Value Date    SPECGRAVITY 1.030 06/21/2019    NITRITE Positive 06/21/2019       No results found.          Melissa Barillas CNM  7/16/2025  2:16 PM

## 2025-07-16 NOTE — PROGRESS NOTES
Received patient into room via wheelchair and accompanied by significant other as well as nursing staff.  Pt transferred to bed. VSS WNL. ID bands matched with L&D RN. Patient oriented to unit, room, and call light within reach. POC reviewed with patient. Instructed to call for assistance when ready to void. Report received from NAYELY Brooks.

## 2025-07-16 NOTE — ANESTHESIA POSTPROCEDURE EVALUATION
Patient: Melissa Desai    Procedure Summary       Date: 07/15/25 Room / Location:     Anesthesia Start: 1600 Anesthesia Stop: 2328    Procedure: LABOR ANALGESIA Diagnosis:     Scheduled Providers:  Anesthesiologist: Calvin Robledo MD    Anesthesia Type: epidural ASA Status: 2            Anesthesia Type: No value filed.    Vitals Value Taken Time   /80 07/16/25 00:16   Temp 98.5 °F (36.9 °C) 07/15/25 23:30   Pulse 95 07/16/25 00:16   Resp 18 07/15/25 23:30   SpO2  07/16/25 01:06       [facility] AN Post Evaluation:   Patient Evaluated in PACU  Patient Participation: complete - patient participated  Level of Consciousness: awake  Pain Management: adequate  Airway Patency:patent  Dental exam unchanged from preop  Yes    Cardiovascular Status: acceptable  Respiratory Status: acceptable  Postoperative Hydration acceptable      CALVIN ROBLEDO MD  7/16/2025 1:06 AM

## 2025-07-16 NOTE — L&D DELIVERY NOTE
Prince Desai [[id number]]      Labor Events     labor?: No   steroids?: None  Antibiotics received during labor?: No  Rupture date/time: 7/15/2025 1726     Rupture type: AROM  Fluid color: Clear  Labor type: Induced Onset of Labor  Induction: Cervical Ripening Balloon  Indications for induction: Preeclampsia       Labor Length    1st stage: 7h 39m  2nd stage: 1h 42m  3rd stage: 0h 06m       Labor Event Times    Labor onset date/time: 7/15/2025 1400  Dilation complete date/time: 7/15/2025 2139  Start pushing date/time: 7/15/2025 21:43       [place] Presentation    Presentation: Vertex  Position: Occiput Anterior       Operative Delivery    Operative Vaginal Delivery: No                Shoulder Dystocia    Shoulder Dystocia: No       Anesthesia    Method: Epidural               Delivery      Head delivery date/time: 7/15/2025 23:21:54   Delivery date/time:  7/15/25 23:21:59   Delivery type: Normal spontaneous vaginal delivery    Details:     Delivery location: delivery room  Delivery Room Temperature: 71       Delivery Providers    Delivering Clinician: Janet Alvarez CNM   Delivery personnel:  Provider Role   Henry Vázquez, RN Baby Nurse   Cecilia Davis RN Delivery Nurse   Rosa Stiles Surgical Tech             Cord    Vessels: 3 Vessels  Complications: Nuchal  # of loops: 1  Timed cord clamping: Yes  Time in sec: 90  Cord blood disposition: to lab  Gases sent?: No       Resuscitation    Method: None        Measurements      Weight: 3370 g 7 lb 6.9 oz Length: 50.8 cm     Head circum.: 35 cm              Placenta    Date/time: 7/15/2025 23:28  Removal: Spontaneous  Appearance: Intact  Disposition: Discarded       Apgars    Living status: Living   Apgar Scoring Key:    0 1 2    Skin color Blue or pale Acrocyanotic Completely pink    Heart rate Absent <100 bpm >100 bpm    Reflex irritability No response Grimace Cry or active withdrawal    Muscle tone Limp Some  flexion Active motion    Respiratory effort Absent Weak cry; hypoventilation Good, crying              1 Minute:  5 Minute:  10 Minute:  15 Minute:  20 Minute:      Skin color: 0  1       Heart rate: 2  2       Reflex irritablity: 2  2       Muscle tone: 2  2       Respiratory effort: 2  2       Total: 8  9          Apgars assigned by: NAYELY GUERRA       Skin to Skin    Skin to skin initiated date/time: 7/15/2025 2321  Skin to skin with: Mother       Vaginal Count    Initial count RN: Cecilia Davis RN  Initial count Tech: Stiles, Rosa   Sponges   Sharps    Initial counts 10   0    Final counts 10   1    Final count RN: Cecilia Davis RN  Final count MD: Janet Alvarez CNM       Lacerations    Episiotomy: None  Perineal lacerations: 2nd Repaired?: Yes     Vaginal laceration?: No      Cervical laceration?: No    Clitoral laceration?: No    Quantitative blood loss (mL): 75          [facility]  part of [facility]    Vaginal Delivery Note    Melissa Garnerole Giselle Patient Status:  Inpatient    3/27/2000 MRN [id number]   Location [facility] Attending Sobia Payton CNM   Hosp Day # 1 PCP Cynthia Latham MD     Delivery     Diagnosis: , IOL for preEclampsia    Labor Details: Spontaneous    Procedure: spontaneous vaginal delivery    Neonatologist Present: no    Placenta  Date/Time of Delivery: 7/15/2025 11:28 PM   Delivery: spontaneous  Placenta to Pathology: no  Cord Gases Submitted: no    Cord Complications: single nuchal    Maternal Anesthesia: epidural   Episiotomy/Laceration Repair  Laceration: perineal 2nd degree repaired    Sponge and Needle Counts:  Verified yes    Delivery Complications  none    Hemorrhage?: No, patient is stable, asymptomatic and blood loss is within expected amount following delivery. Standard treatment provided to prevent PPH. Patient does not meet ACOG criteria for hemorrhage at this time.    QBL: Quantitative Blood Loss (mL) 75        Delivery Comments:   Melissa progressed to complete dilatation and +1 station prior to pushing successfully to viable baby boy. See Delivery Summary for time, APGARs, and weight. Fetal head presented in the GABRIEL position. Sweep for nuchal cord was performed and no nuchal cord identified. Anterior shoulder delivered spontaneously without traction. Baby vigorous at birth. To maternal abdomen for dry and stimulation then cord cut after pulsing ceased. Prophylactic IV pitocin initiated in 3rd stage. Spontaneous placenta by joaquin mechanism, complete with 3 vessel cord. Hemostasis achieved by fundal massage and prophylactic IV Pitocin. Vagina and perineum inspected and 2nd degree lac repaired in usual fashion with 3-0 vicryl with good hemostasis and approximation. Mother and infant appeared in stable condition when midwife left the delivery room. Sharps and 4x4 counts were correct. Skin to skin initiated.       Janet Alvarez CNM   7/16/2025  12:11 AM

## 2025-07-17 VITALS
OXYGEN SATURATION: 100 % | BODY MASS INDEX: 33.8 KG/M2 | DIASTOLIC BLOOD PRESSURE: 85 MMHG | HEIGHT: 64 IN | TEMPERATURE: 99 F | WEIGHT: 198 LBS | SYSTOLIC BLOOD PRESSURE: 130 MMHG | RESPIRATION RATE: 16 BRPM | HEART RATE: 85 BPM

## 2025-07-17 LAB
BASOPHILS # BLD AUTO: 0.05 X10(3) UL (ref 0–0.2)
BASOPHILS NFR BLD AUTO: 0.4 %
DEPRECATED RDW RBC AUTO: 45.4 FL (ref 35.1–46.3)
EOSINOPHIL # BLD AUTO: 0.15 X10(3) UL (ref 0–0.7)
EOSINOPHIL NFR BLD AUTO: 1.1 %
ERYTHROCYTE [DISTWIDTH] IN BLOOD BY AUTOMATED COUNT: 15 % (ref 11–15)
HCT VFR BLD AUTO: 30 % (ref 35–48)
HGB BLD-MCNC: 9.2 G/DL (ref 12–16)
IMM GRANULOCYTES # BLD AUTO: 0.09 X10(3) UL (ref 0–1)
IMM GRANULOCYTES NFR BLD: 0.7 %
LYMPHOCYTES # BLD AUTO: 3.82 X10(3) UL (ref 1–4)
LYMPHOCYTES NFR BLD AUTO: 27.8 %
MCH RBC QN AUTO: 25.8 PG (ref 26–34)
MCHC RBC AUTO-ENTMCNC: 30.7 G/DL (ref 31–37)
MCV RBC AUTO: 84.3 FL (ref 80–100)
MONOCYTES # BLD AUTO: 0.67 X10(3) UL (ref 0.1–1)
MONOCYTES NFR BLD AUTO: 4.9 %
NEUTROPHILS # BLD AUTO: 8.94 X10 (3) UL (ref 1.5–7.7)
NEUTROPHILS # BLD AUTO: 8.94 X10(3) UL (ref 1.5–7.7)
NEUTROPHILS NFR BLD AUTO: 65.1 %
PLATELET # BLD AUTO: 337 10(3)UL (ref 150–450)
RBC # BLD AUTO: 3.56 X10(6)UL (ref 3.8–5.3)
WBC # BLD AUTO: 13.7 X10(3) UL (ref 4–11)

## 2025-07-17 NOTE — PROGRESS NOTES
[facility]  part of [facility]    OB/GYNE Progress Note      Melissa Desai Patient Status:  Inpatient    3/27/2000 MRN [id number]   Location [facility] 1W Attending Sobia Payton CNM   Hosp Day # 2 PCP Cynthia Latham MD        Assessment/Plan   Melissa Desai is a 25 year old , day 2 after a vaginal delivery  Breast feeding without difficulty   Discharge home today  Postpartum teaching completed including danger signs and when to call the CNM        Vaginal delivery (HCC)  Stable       Anemia, antepartum ([facility])  Hgb was 8.7 yesterday  Received Venofer  Hgb up slightly this AM      Pre-eclampsia in third trimester ([facility])  BP's normal posptartum  Asymptomatic       Discussed with: nurse     patient and partner     Plan discussed with patient who verbalizes understanding and agreement.        Subjective   Currently she denies excessive bleeding or pain. No clots.  States baby is latching and sucking well.   Support at home: partner  Has car seat     Review of Systems:  Constitutional: Denies   Genitourinary: Denies   Respiratory: Denies   Psychiatric: Denies         Objective   Vital signs in last 24 hours:  Temp:  [98.1 °F (36.7 °C)-98.4 °F (36.9 °C)] 98.2 °F (36.8 °C)  Pulse:  [62-75] 63  Resp:  [16] 16  BP: (126-138)/(70-82) 127/75    Input/Output:    Intake/Output Summary (Last 24 hours) at 2025 0620  Last data filed at 2025 0810  Gross per 24 hour   Intake --   Output 750 ml   Net -750 ml        Exam:  Constitutional: Comfortable  Respiratory: Unlabored respirations   Extremities: No edema   Psychiatric: Calm affect, appropriate         Results:     Lab Results   Component Value Date    TREPONEMALAB Nonreactive 07/15/2025    ABO A 07/15/2025    RH Positive 07/15/2025    WBC 17.1 (H) 2025    HGB 8.7 (L) 2025    HCT 27.5 (L) 2025    .0 2025    CREATSERUM 0.72 2025    BUN 7 (L) 2025     2025    K 3.7  07/16/2025     07/16/2025    CO2 23.0 07/16/2025     (H) 07/16/2025    CA 8.6 (L) 07/16/2025    ALB 3.4 07/16/2025    ALKPHO 234 (H) 07/16/2025    BILT 0.5 07/16/2025    TP 5.5 (L) 07/16/2025    AST 38 (H) 07/16/2025    ALT 18 07/16/2025       Lab Results   Component Value Date    SPECGRAVITY 1.030 06/21/2019    NITRITE Positive 06/21/2019       No results found.          Tomasa Rubio CNM  7/17/2025  6:20 AM

## 2025-07-17 NOTE — DISCHARGE SUMMARY
[facility]  part of [facility]    Discharge Summary    Melissa Desai Patient Status:  Inpatient    3/27/2000 MRN [id number]   Location [facility] 1W Attending Sobia Payton CNM   Hosp Day # 2       Delivering OB Clinician: Janet Alvarez CNM    EDC: Estimated Date of Delivery: 25    Gestational Age: 38w1d    Antepartum complications: Problem List[1]    Date of Delivery: 7/15/2025 Time of Delivery: 11:21 PM    Delivery Type: spontaneous vaginal delivery    Baby: Liveborn male   Information for the patient's :  Prince Desai [[id number]]   7 lb 6.9 oz (3.37 kg)  Apgars:  1 minute: 8  5 minutes: 910 minutes:       Intrapartum Complications: pre-eclampsia without severe features    Admit Date: 7/15/2025    Discharge Date: 2025    Hospital Course: No complications Routine delivery and postpartum care    Discharged Condition: stable    Disposition: home    Plan:     Follow-up appointment in 3 days for BP check with RN then in 2 weeks with Janet Alvarez CNM Tina Mingo, CNM  2025  6:23 AM             [1]   Patient Active Problem List  Diagnosis    Anemia, antepartum (HCC)    Pre-eclampsia in third trimester (HCC)    Vaginal delivery (HCC)

## 2025-07-18 ENCOUNTER — TELEPHONE (OUTPATIENT)
Dept: OBGYN CLINIC | Facility: CLINIC | Age: 25
End: 2025-07-18

## 2025-07-21 ENCOUNTER — NURSE ONLY (OUTPATIENT)
Dept: OBGYN CLINIC | Facility: CLINIC | Age: 25
End: 2025-07-21
Payer: COMMERCIAL

## 2025-07-21 ENCOUNTER — OFFICE VISIT (OUTPATIENT)
Dept: OBGYN CLINIC | Facility: CLINIC | Age: 25
End: 2025-07-21

## 2025-07-21 ENCOUNTER — LAB ENCOUNTER (OUTPATIENT)
Dept: LAB | Facility: HOSPITAL | Age: 25
End: 2025-07-21
Attending: ADVANCED PRACTICE MIDWIFE
Payer: COMMERCIAL

## 2025-07-21 VITALS — DIASTOLIC BLOOD PRESSURE: 83 MMHG | HEART RATE: 114 BPM | SYSTOLIC BLOOD PRESSURE: 130 MMHG

## 2025-07-21 VITALS — HEART RATE: 90 BPM | SYSTOLIC BLOOD PRESSURE: 150 MMHG | DIASTOLIC BLOOD PRESSURE: 79 MMHG

## 2025-07-21 DIAGNOSIS — R03.0 ELEVATED BLOOD PRESSURE READING: Primary | ICD-10-CM

## 2025-07-21 DIAGNOSIS — R03.0 ELEVATED BLOOD PRESSURE READING: ICD-10-CM

## 2025-07-21 LAB
ALBUMIN SERPL-MCNC: 4.3 G/DL (ref 3.2–4.8)
ALBUMIN/GLOB SERPL: 1.7 {RATIO} (ref 1–2)
ALP LIVER SERPL-CCNC: 205 U/L (ref 52–144)
ALT SERPL-CCNC: 53 U/L (ref 10–49)
ANION GAP SERPL CALC-SCNC: 9 MMOL/L (ref 0–18)
AST SERPL-CCNC: 41 U/L (ref ?–34)
BILIRUB SERPL-MCNC: 0.5 MG/DL (ref 0.3–1.2)
BUN BLD-MCNC: 9 MG/DL (ref 9–23)
BUN/CREAT SERPL: 12.9 (ref 10–20)
CALCIUM BLD-MCNC: 9.2 MG/DL (ref 8.7–10.4)
CHLORIDE SERPL-SCNC: 106 MMOL/L (ref 98–112)
CO2 SERPL-SCNC: 24 MMOL/L (ref 21–32)
CREAT BLD-MCNC: 0.7 MG/DL (ref 0.55–1.02)
DEPRECATED RDW RBC AUTO: 46 FL (ref 35.1–46.3)
EGFRCR SERPLBLD CKD-EPI 2021: 125 ML/MIN/1.73M2 (ref 60–?)
ERYTHROCYTE [DISTWIDTH] IN BLOOD BY AUTOMATED COUNT: 16.6 % (ref 11–15)
FASTING STATUS PATIENT QL REPORTED: YES
GLOBULIN PLAS-MCNC: 2.6 G/DL (ref 2–3.5)
GLUCOSE BLD-MCNC: 87 MG/DL (ref 70–99)
HCT VFR BLD AUTO: 34.1 % (ref 35–48)
HGB BLD-MCNC: 10.6 G/DL (ref 12–16)
MCH RBC QN AUTO: 27 PG (ref 26–34)
MCHC RBC AUTO-ENTMCNC: 31.1 G/DL (ref 31–37)
MCV RBC AUTO: 86.8 FL (ref 80–100)
OSMOLALITY SERPL CALC.SUM OF ELEC: 286 MOSM/KG (ref 275–295)
PLATELET # BLD AUTO: 494 10(3)UL (ref 150–450)
POTASSIUM SERPL-SCNC: 3.8 MMOL/L (ref 3.5–5.1)
PROT SERPL-MCNC: 6.9 G/DL (ref 5.7–8.2)
RBC # BLD AUTO: 3.93 X10(6)UL (ref 3.8–5.3)
SODIUM SERPL-SCNC: 139 MMOL/L (ref 136–145)
WBC # BLD AUTO: 10.8 X10(3) UL (ref 4–11)

## 2025-07-21 PROCEDURE — 3079F DIAST BP 80-89 MM HG: CPT | Performed by: ADVANCED PRACTICE MIDWIFE

## 2025-07-21 PROCEDURE — 80053 COMPREHEN METABOLIC PANEL: CPT | Performed by: ADVANCED PRACTICE MIDWIFE

## 2025-07-21 PROCEDURE — 3077F SYST BP >= 140 MM HG: CPT | Performed by: ADVANCED PRACTICE MIDWIFE

## 2025-07-21 PROCEDURE — 85027 COMPLETE CBC AUTOMATED: CPT | Performed by: ADVANCED PRACTICE MIDWIFE

## 2025-07-21 PROCEDURE — 3078F DIAST BP <80 MM HG: CPT | Performed by: ADVANCED PRACTICE MIDWIFE

## 2025-07-21 PROCEDURE — 3075F SYST BP GE 130 - 139MM HG: CPT | Performed by: ADVANCED PRACTICE MIDWIFE

## 2025-07-21 NOTE — PROGRESS NOTES
Elevated BP at 6 days PP.  Repeat WNL.  Pt denies h/a visual changes or epigastric pain.  BP at home have been normal    Melissa was seen today for postpartum care.    Diagnoses and all orders for this visit:    Elevated blood pressure reading  -     Comp Metabolic Panel (14); Future  -     CBC, Platelet; No Differential; Future     Continue to monitor BP at home call if >140/90  Warning signs reviewed

## 2025-07-23 ENCOUNTER — RESULTS FOLLOW-UP (OUTPATIENT)
Dept: OBGYN CLINIC | Facility: CLINIC | Age: 25
End: 2025-07-23

## 2025-07-23 DIAGNOSIS — R03.0 ELEVATED BLOOD PRESSURE READING: Primary | ICD-10-CM

## 2025-07-24 NOTE — TELEPHONE ENCOUNTER
----- Message from Delores Kirk sent at 7/23/2025  4:38 PM CDT -----  [phone] (home) [phone] (work) LMTC LFTs slightly elevated not unusual PP if BP have been normal range at home repeat COMP and CBC in 1 week if BP have been elevated let me know or page CNM   on call  ----- Message -----  From: Lab, Background User  Sent: 7/21/2025   2:49 PM CDT  To: Delores Kirk CNM

## 2025-08-01 ENCOUNTER — TELEMEDICINE (OUTPATIENT)
Dept: OBGYN CLINIC | Facility: CLINIC | Age: 25
End: 2025-08-01

## 2025-08-26 ENCOUNTER — POSTPARTUM (OUTPATIENT)
Dept: OBGYN CLINIC | Facility: CLINIC | Age: 25
End: 2025-08-26

## 2025-08-26 VITALS
DIASTOLIC BLOOD PRESSURE: 69 MMHG | HEART RATE: 101 BPM | SYSTOLIC BLOOD PRESSURE: 126 MMHG | HEIGHT: 64 IN | WEIGHT: 163 LBS | BODY MASS INDEX: 27.83 KG/M2

## 2025-08-26 DIAGNOSIS — R74.8 ELEVATED LIVER ENZYMES: ICD-10-CM

## 2025-08-26 DIAGNOSIS — O99.019: ICD-10-CM

## 2025-08-26 DIAGNOSIS — Z30.011 ENCOUNTER FOR INITIAL PRESCRIPTION OF CONTRACEPTIVE PILLS: ICD-10-CM

## 2025-08-26 PROBLEM — O14.93 PRE-ECLAMPSIA IN THIRD TRIMESTER (HCC): Status: RESOLVED | Noted: 2025-07-09 | Resolved: 2025-08-26

## 2025-08-26 PROCEDURE — 3074F SYST BP LT 130 MM HG: CPT | Performed by: ADVANCED PRACTICE MIDWIFE

## 2025-08-26 PROCEDURE — 3078F DIAST BP <80 MM HG: CPT | Performed by: ADVANCED PRACTICE MIDWIFE

## 2025-08-26 PROCEDURE — 3008F BODY MASS INDEX DOCD: CPT | Performed by: ADVANCED PRACTICE MIDWIFE

## 2025-08-26 RX ORDER — NORGESTIMATE AND ETHINYL ESTRADIOL 7DAYSX3 28
1 KIT ORAL DAILY
Qty: 84 EACH | Refills: 4 | Status: SHIPPED | OUTPATIENT
Start: 2025-08-26 | End: 2026-08-26

## (undated) NOTE — LETTER
10/2/2020              [address]        [place] [id number]         Dear Karri Lyons,    It was a pleasure to see you. Your cultures were normal.  There is no need for further testing at this time.   I look forward to seei

## (undated) NOTE — LETTER
[facility]  Administration of Anesthesia  I, Melissa Desai agree to be cared for by a physician anesthesiologist alone and/or with a nurse anesthetist, who is specially trained to monitor me and give me medicine to put me to sleep or keep me comfortable during my procedure    I understand that my anesthesiologist and/or anesthetist is not an employee or agent of [facility] or [employer]. He or she works for [facility]    As the patient asking for anesthesia services, I agree to:  Allow the anesthesiologist (anesthesia doctor) to give me medicine and do additional procedures as necessary. Some examples are: Starting or using an “IV” to give me medicine, fluids or blood during my procedure, and having a breathing tube placed to help me breathe when I’m asleep (intubation). In the event that my heart stops working properly, I understand that my anesthesiologist will make every effort to sustain my life, unless otherwise directed by [facility] Do Not Resuscitate documents.  Tell my anesthesia doctor before my procedure:  If I am pregnant.  The last time that I ate or drank.  iii. All of the medicines I take (including prescriptions, herbal supplements, and pills I can buy without a prescription (including street drugs/illegal medications). Failure to inform my anesthesiologist about these medicines may increase my risk of anesthetic complications.  iv.If I am allergic to anything or have had a reaction to anesthesia before.  I understand how the anesthesia medicine will help me (benefits).  I understand that with any type of anesthesia medicine there are risks:  The most common risks are: nausea, vomiting, sore throat, muscle soreness, damage to my eyes, mouth, or teeth (from breathing tube placement).  Rare risks include: remembering what happened during my procedure, allergic reactions to medications, injury to my airway, heart, lungs, vision, nerves,  or muscles and in extremely rare instances death.  My doctor has explained to me other choices available to me for my care (alternatives).  Pregnant Patients (“epidural”):  I understand that the risks of having an epidural (medicine given into my back to help control pain during labor), include itching, low blood pressure, difficulty urinating, headache or slowing of the baby’s heart. Very rare risks include infection, bleeding, seizure, irregular heart rhythms and nerve injury.  Regional Anesthesia (“spinal”, “epidural”, & “nerve blocks”):  I understand that rare but potential complications include headache, bleeding, infection, seizure, irregular heart rhythms, and nerve injury.    _____________________________________________________________________________  Patient (or Representative) Signature/Relationship to Patient  Date   Time    _____________________________________________________________________________   Name (if used)    Language/Organization   Time    _____________________________________________________________________________  Nurse Anesthetist Signature     Date   Time  _____________________________________________________________________________  Anesthesiologist Signature     Date   Time  I have discussed the procedure and information above with the patient (or patient’s representative) and answered their questions. The patient or their representative has agreed to have anesthesia services.    _____________________________________________________________________________  Witness        Date   Time  I have verified that the signature is that of the patient or patient’s representative, and that it was signed before the procedure  Patient Name: Melissa Morin Giselle     : 3/27/2000                 Printed: 7/15/2025 at 8:10 AM    Medical Record #: [id number]                                            Page 1 of 1  ----------ANESTHESIA CONSENT----------

## (undated) NOTE — LETTER
Name:  Geralynn Sandhoff Year:  12th Grade Class: Student ID No.:   Address:  [address] [place] [address]  [address] [facility] [address] Phone:  [phone] (home) [phone] (work) :  18year old   Name Relationship [address] Work [employer] 12. Has anyone in your family had unexplained fainting, seizures, or near drowning? BONE AND JOINT QUESTIONS Yes No   17. Have you ever had an injury to a bone, muscle, ligament, or tendon that caused you to miss a practice or a game?      18. Have you /fall?     36. Have you ever become ill while exercising in the heat?     41. Do you get frequent muscle cramps when exercising? 42. Do you or someone in your family have sickle cell trait or disease? 43.  Have you ever had any problems with your ey · Location of point of maximal impulse (PMI) Yes    Pulses Yes    Lungs Yes    Abdomen Yes    Genitourinary (males only)* N/A    Skin:  HSV, lesions suggestive of MRSA, tinea corporis Yes    Neurologic* Yes    MUSCULOSKELETAL     Neck Yes    Back Yes    Sh performance-enhancing substances in my/his/her body either during [employer] state series events or during the school day, and I/our student do/does hereby agree to submit to such testing and analysis by a certified laboratory.  We further understand and agree th

## (undated) NOTE — ED AVS SNAPSHOT
Minna Johnson   MRN: [id number]    Department:  [facility]   Date of Visit:  4/27/2018           Disclosure     Insurance plans vary and the physician(s) referred by the ER may not be covered by your plan.  Please co CARE PHYSICIAN AT ONCE OR RETURN IMMEDIATELY TO THE EMERGENCY DEPARTMENT. If you have been prescribed any medication(s), please fill your prescription right away and begin taking the medication(s) as directed.   If you believe that any of the medications

## (undated) NOTE — LETTER
VACCINE ADMINISTRATION RECORD  PARENT / GUARDIAN APPROVAL  Date: 5/15/2025  Vaccine administered to: Melissa Desai     : 3/27/2000    MRN: [id number]    A copy of the appropriate Centers for Disease Control and Prevention Vaccine Information statement has been provided. I have read or have had explained the information about the diseases and the vaccines listed below. There was an opportunity to ask questions and any questions were answered satisfactorily. I believe that I understand the benefits and risks of the vaccine cited and ask that the vaccine(s) listed below be given to me or to the person named above (for whom I am authorized to make this request).    VACCINES ADMINISTERED:  Tdap    I have read and hereby agree to be bound by the terms of this agreement as stated above. My signature is valid until revoked by me in writing.  This document is signed by patient, relationship: Self on 5/15/2025.:                                                                                                                                         Parent / Guardian Signature                                                Date

## (undated) NOTE — LETTER
[facility] [employer] of Child Health Examination       Student's Name  Gina Fernandez Title    MD                       Date  6/21/2019   Signature HEALTH HISTORY          TO BE COMPLETED AND SIGNED BY PARENT/GUARDIAN AND VERIFIED BY HEALTH CARE PROVIDER    ALLERGIES  (Food, drug, insect, other)  Patient has no known allergies.  MEDICATION  (List all prescribed or taken on a regular basis.)     Diagnos /78   Pulse 92   Ht 5' 4.75\" (1.645 m)   Wt 65.8 kg (145 lb)   LMP 05/30/2019 (Exact Date)   BMI 24.32 kg/m²     DIABETES SCREENING  BMI>85% age/sex  No And any two of the following:  Family History Yes    Ethnic Minority  No          Signs of Insul Respiratory Yes                   Diagnosis of Asthma: No Mental Health Yes        Currently Prescribed Asthma Medication:            Quick-relief  medication (e.g. Short Acting Beta Antagonist): No          Controller medication (e.g. inhaled corticostero